# Patient Record
Sex: FEMALE | Race: WHITE | Employment: FULL TIME | URBAN - METROPOLITAN AREA
[De-identification: names, ages, dates, MRNs, and addresses within clinical notes are randomized per-mention and may not be internally consistent; named-entity substitution may affect disease eponyms.]

---

## 2018-07-31 PROBLEM — S46.111A RUPTURE OF RIGHT LONG HEAD BICEPS TENDON: Status: ACTIVE | Noted: 2018-07-31

## 2018-07-31 PROBLEM — M19.011 DEGENERATIVE JOINT DISEASE OF RIGHT ACROMIOCLAVICULAR JOINT: Status: ACTIVE | Noted: 2018-07-31

## 2018-07-31 PROBLEM — M25.811: Status: ACTIVE | Noted: 2018-07-31

## 2018-07-31 PROBLEM — S43.431A SUPERIOR GLENOID LABRUM LESION OF RIGHT SHOULDER: Status: ACTIVE | Noted: 2018-07-31

## 2018-07-31 NOTE — BRIEF OP NOTE
BRIEF OPERATIVE NOTE Date of Procedure: 8/10/2018 Preoperative Diagnosis:  BICEPITAL STRAIN RIGHT SHOULDER 
    SLAP TEAR RIGHT SHOULDER 
    AC OA RIGHT SHOULDER 
    OS ACROMIALE RIGHT SHOULDER Postoperative Diagnosis:  BICEPITAL STRAIN RIGHT SHOULDER 
    AC OA RIGHT SHOULDER 
    OA ACROMIALE RIGHT SHOULDER Procedure(s): ARTHROSCOPY RIGHT SHOULDER ARTHROSCOPIC DISTAL CLAVICLE RESECTION, EXTENSIVE DEBRIDEMENT GLENOHUMERAL JOINT, SUBACROMIAL SPACE, MINI OPEN BICEPS TENODESIS Surgeon(s) and Role: 
   * Gregg Hernandez MD - Primary Assistant Staff:  [facility] Surgical Staff: 
Circ-1: (Unknown) Scrub Tech-1: (Unknown) Scrub Tech-2: (Unknown) Scrub Tech-3: (Unknown) No case tracking events are documented in the log. Anesthesia:  GENERAL WITH INTERSCALENE BLOCK Estimated Blood Loss: 10 CC Complications: NONE Implants:  
 
Implant Name Type Inv. Item Serial No.  Lot No. LRB No. Used Action ANCHOR SUT 5.5MM W/NDL PEEK ZP --  - [id number]   ANCHOR SUT 5.5MM W/NDL PEEK ZP --  [id number] [facility] [id number] Right 1 Implanted Gregg Hernandez MD

## 2018-08-03 ENCOUNTER — HOSPITAL ENCOUNTER (OUTPATIENT)
Dept: SURGERY | Age: 33
Discharge: HOME | End: 2018-08-03

## 2018-08-08 VITALS — WEIGHT: 150 LBS | BODY MASS INDEX: 23.54 KG/M2 | HEIGHT: 67 IN

## 2018-08-08 RX ORDER — BISMUTH SUBSALICYLATE 262 MG
1 TABLET,CHEWABLE ORAL DAILY
COMMUNITY

## 2018-08-08 RX ORDER — DICLOFENAC SODIUM 10 MG/G
GEL TOPICAL 4 TIMES DAILY
COMMUNITY

## 2018-08-08 RX ORDER — TIZANIDINE 2 MG/1
TABLET ORAL
COMMUNITY

## 2018-08-08 RX ORDER — DICLOFENAC SODIUM 75 MG/1
50 TABLET, DELAYED RELEASE ORAL 2 TIMES DAILY
COMMUNITY

## 2018-08-08 NOTE — PERIOP NOTES
Patient verified name, , and surgery as listed in [facility]. Type 1B surgery, PAT phone assessment complete. Orders received. Labs per surgeon: Hemoglobin A1C DOS and POC FBS DOS; orders signed and held in [facility]. Labs per anesthesia protocol: POC UHCG DOS; order signed and held in [facility]. Patient answered medical/surgical history questions at their best of ability. All prior to admission medications documented in [facility]. Patient instructed to take the following medications the day of surgery according to anesthesia guidelines with a small sip of water: Tizanidine if needed. Hold all vitamins 7 days prior to surgery and NSAIDS 5 days prior to surgery. Medications to be held: all vitamins/supplements/herbals and Diclofenac. Patient instructed on the following:  Arrive at [address], time of arrival to be called the day before by 1700  NPO after midnight including gum, mints, and ice chips  Responsible adult must drive patient to the hospital, stay during surgery, and patient will  need supervision 24 hours after anesthesia  Use antibacterial soap in shower the night before surgery and on the morning of surgery  Leave all valuables (money and jewelry) at home but bring insurance card and ID on       DOS  Do not wear make-up, nail polish, lotions, cologne, perfumes, powders, or oil on skin. Patient teach back successful and patient demonstrates knowledge of instruction.

## 2018-08-10 ENCOUNTER — APPOINTMENT (OUTPATIENT)
Dept: GENERAL RADIOLOGY | Age: 33
End: 2018-08-10
Attending: ORTHOPAEDIC SURGERY
Payer: OTHER MISCELLANEOUS

## 2018-08-10 ENCOUNTER — ANESTHESIA EVENT (OUTPATIENT)
Dept: SURGERY | Age: 33
End: 2018-08-10
Payer: OTHER MISCELLANEOUS

## 2018-08-10 ENCOUNTER — ANESTHESIA (OUTPATIENT)
Dept: SURGERY | Age: 33
End: 2018-08-10
Payer: OTHER MISCELLANEOUS

## 2018-08-10 ENCOUNTER — HOSPITAL ENCOUNTER (OUTPATIENT)
Age: 33
Setting detail: OUTPATIENT SURGERY
Discharge: HOME | End: 2018-08-10
Attending: ORTHOPAEDIC SURGERY | Admitting: ORTHOPAEDIC SURGERY
Payer: OTHER MISCELLANEOUS

## 2018-08-10 VITALS
DIASTOLIC BLOOD PRESSURE: 68 MMHG | SYSTOLIC BLOOD PRESSURE: 121 MMHG | TEMPERATURE: 97.5 F | WEIGHT: 150.5 LBS | OXYGEN SATURATION: 98 % | RESPIRATION RATE: 16 BRPM | HEART RATE: 64 BPM | BODY MASS INDEX: 23.57 KG/M2

## 2018-08-10 PROBLEM — S43.431A SUPERIOR GLENOID LABRUM LESION OF RIGHT SHOULDER: Status: RESOLVED | Noted: 2018-07-31 | Resolved: 2018-08-10

## 2018-08-10 LAB
EST. AVERAGE GLUCOSE BLD GHB EST-MCNC: 105 MG/DL
GLUCOSE BLD STRIP.AUTO-MCNC: 81 MG/DL (ref 65–100)
HBA1C MFR BLD: 5.3 % (ref 4.8–6)
HCG UR QL: NEGATIVE

## 2018-08-10 PROCEDURE — 77030033073 HC TBNG ARTHSC PMP OUTFLO STRY -B: Performed by: ORTHOPAEDIC SURGERY

## 2018-08-10 PROCEDURE — 74011250636 HC RX REV CODE- 250/636

## 2018-08-10 PROCEDURE — 77030018836 HC SOL IRR NACL ICUM -A: Performed by: ORTHOPAEDIC SURGERY

## 2018-08-10 PROCEDURE — 76060000033 HC ANESTHESIA 1 TO 1.5 HR: Performed by: ORTHOPAEDIC SURGERY

## 2018-08-10 PROCEDURE — 77030031139 HC SUT VCRL2 J&J -A: Performed by: ORTHOPAEDIC SURGERY

## 2018-08-10 PROCEDURE — 77030004453 HC BUR SHV STRY -B: Performed by: ORTHOPAEDIC SURGERY

## 2018-08-10 PROCEDURE — 77030003666 HC NDL SPINAL BD -A: Performed by: ORTHOPAEDIC SURGERY

## 2018-08-10 PROCEDURE — 77030008703 HC TU ET UNCUF COVD -A: Performed by: NURSE ANESTHETIST, CERTIFIED REGISTERED

## 2018-08-10 PROCEDURE — 76942 ECHO GUIDE FOR BIOPSY: CPT | Performed by: ORTHOPAEDIC SURGERY

## 2018-08-10 PROCEDURE — 76210000016 HC OR PH I REC 1 TO 1.5 HR: Performed by: ORTHOPAEDIC SURGERY

## 2018-08-10 PROCEDURE — 74011250636 HC RX REV CODE- 250/636: Performed by: ORTHOPAEDIC SURGERY

## 2018-08-10 PROCEDURE — 77030006668 HC BLD SHV MENSCS STRY -B: Performed by: ORTHOPAEDIC SURGERY

## 2018-08-10 PROCEDURE — 77030020782 HC GWN BAIR PAWS FLX 3M -B: Performed by: NURSE ANESTHETIST, CERTIFIED REGISTERED

## 2018-08-10 PROCEDURE — 74011000250 HC RX REV CODE- 250

## 2018-08-10 PROCEDURE — 77030002986 HC SUT PROL J&J -A: Performed by: ORTHOPAEDIC SURGERY

## 2018-08-10 PROCEDURE — 77030018986 HC SUT ETHBND4 J&J -B: Performed by: ORTHOPAEDIC SURGERY

## 2018-08-10 PROCEDURE — A4565 SLINGS: HCPCS | Performed by: ORTHOPAEDIC SURGERY

## 2018-08-10 PROCEDURE — 77030003602 HC NDL NRV BLK BBMI -B: Performed by: NURSE ANESTHETIST, CERTIFIED REGISTERED

## 2018-08-10 PROCEDURE — 74011000250 HC RX REV CODE- 250: Performed by: ORTHOPAEDIC SURGERY

## 2018-08-10 PROCEDURE — 77030006891 HC BLD SHV RESECT STRY -B: Performed by: ORTHOPAEDIC SURGERY

## 2018-08-10 PROCEDURE — C1713 ANCHOR/SCREW BN/BN,TIS/BN: HCPCS | Performed by: ORTHOPAEDIC SURGERY

## 2018-08-10 PROCEDURE — 77030008477 HC STYL SATN SLP COVD -A: Performed by: NURSE ANESTHETIST, CERTIFIED REGISTERED

## 2018-08-10 PROCEDURE — 82962 GLUCOSE BLOOD TEST: CPT

## 2018-08-10 PROCEDURE — 83036 HEMOGLOBIN GLYCOSYLATED A1C: CPT

## 2018-08-10 PROCEDURE — 76010000160 HC OR TIME 0.5 TO 1 HR INTENSV-TIER 1: Performed by: ORTHOPAEDIC SURGERY

## 2018-08-10 PROCEDURE — 81025 URINE PREGNANCY TEST: CPT

## 2018-08-10 PROCEDURE — 76010010054 HC POST OP PAIN BLOCK: Performed by: ORTHOPAEDIC SURGERY

## 2018-08-10 PROCEDURE — 77030019908 HC STETH ESOPH SIMS -A: Performed by: NURSE ANESTHETIST, CERTIFIED REGISTERED

## 2018-08-10 PROCEDURE — 73030 X-RAY EXAM OF SHOULDER: CPT

## 2018-08-10 PROCEDURE — 77030033005 HC TBNG ARTHSC PMP STRY -B: Performed by: ORTHOPAEDIC SURGERY

## 2018-08-10 PROCEDURE — 74011250636 HC RX REV CODE- 250/636: Performed by: ANESTHESIOLOGY

## 2018-08-10 DEVICE — 5.5MM PEEK ZIP SUTURE ANCHOR WITH ¿ CIRCLE TAPER NEEDLES, #2 FORCE FIBER
Type: IMPLANTABLE DEVICE | Status: FUNCTIONAL
Brand: PEEK ZIP

## 2018-08-10 RX ORDER — KETOROLAC TROMETHAMINE 30 MG/ML
30 INJECTION, SOLUTION INTRAMUSCULAR; INTRAVENOUS AS NEEDED
Status: DISCONTINUED | OUTPATIENT
Start: 2018-08-10 | End: 2018-08-10 | Stop reason: HOSPADM

## 2018-08-10 RX ORDER — ROCURONIUM BROMIDE 10 MG/ML
INJECTION, SOLUTION INTRAVENOUS AS NEEDED
Status: DISCONTINUED | OUTPATIENT
Start: 2018-08-10 | End: 2018-08-10 | Stop reason: HOSPADM

## 2018-08-10 RX ORDER — LIDOCAINE HYDROCHLORIDE 20 MG/ML
INJECTION, SOLUTION EPIDURAL; INFILTRATION; INTRACAUDAL; PERINEURAL AS NEEDED
Status: DISCONTINUED | OUTPATIENT
Start: 2018-08-10 | End: 2018-08-10 | Stop reason: HOSPADM

## 2018-08-10 RX ORDER — GLYCOPYRROLATE 0.2 MG/ML
INJECTION INTRAMUSCULAR; INTRAVENOUS AS NEEDED
Status: DISCONTINUED | OUTPATIENT
Start: 2018-08-10 | End: 2018-08-10 | Stop reason: HOSPADM

## 2018-08-10 RX ORDER — LIDOCAINE HYDROCHLORIDE AND EPINEPHRINE 10; 10 MG/ML; UG/ML
INJECTION, SOLUTION INFILTRATION; PERINEURAL AS NEEDED
Status: DISCONTINUED | OUTPATIENT
Start: 2018-08-10 | End: 2018-08-10 | Stop reason: HOSPADM

## 2018-08-10 RX ORDER — FENTANYL CITRATE 50 UG/ML
INJECTION, SOLUTION INTRAMUSCULAR; INTRAVENOUS AS NEEDED
Status: DISCONTINUED | OUTPATIENT
Start: 2018-08-10 | End: 2018-08-10 | Stop reason: HOSPADM

## 2018-08-10 RX ORDER — ONDANSETRON 2 MG/ML
INJECTION INTRAMUSCULAR; INTRAVENOUS AS NEEDED
Status: DISCONTINUED | OUTPATIENT
Start: 2018-08-10 | End: 2018-08-10 | Stop reason: HOSPADM

## 2018-08-10 RX ORDER — BUPIVACAINE HYDROCHLORIDE AND EPINEPHRINE 5; 5 MG/ML; UG/ML
INJECTION, SOLUTION EPIDURAL; INTRACAUDAL; PERINEURAL AS NEEDED
Status: DISCONTINUED | OUTPATIENT
Start: 2018-08-10 | End: 2018-08-10 | Stop reason: HOSPADM

## 2018-08-10 RX ORDER — OXYCODONE HYDROCHLORIDE 5 MG/1
10 TABLET ORAL
Status: DISCONTINUED | OUTPATIENT
Start: 2018-08-10 | End: 2018-08-10 | Stop reason: HOSPADM

## 2018-08-10 RX ORDER — CEFAZOLIN SODIUM/WATER 2 G/20 ML
2 SYRINGE (ML) INTRAVENOUS
Status: COMPLETED | OUTPATIENT
Start: 2018-08-10 | End: 2018-08-10

## 2018-08-10 RX ORDER — BUPIVACAINE HYDROCHLORIDE AND EPINEPHRINE 2.5; 5 MG/ML; UG/ML
INJECTION, SOLUTION EPIDURAL; INFILTRATION; INTRACAUDAL; PERINEURAL AS NEEDED
Status: DISCONTINUED | OUTPATIENT
Start: 2018-08-10 | End: 2018-08-10 | Stop reason: HOSPADM

## 2018-08-10 RX ORDER — SODIUM CHLORIDE, SODIUM LACTATE, POTASSIUM CHLORIDE, CALCIUM CHLORIDE 600; 310; 30; 20 MG/100ML; MG/100ML; MG/100ML; MG/100ML
125 INJECTION, SOLUTION INTRAVENOUS CONTINUOUS
Status: DISCONTINUED | OUTPATIENT
Start: 2018-08-10 | End: 2018-08-10 | Stop reason: HOSPADM

## 2018-08-10 RX ORDER — MIDAZOLAM HYDROCHLORIDE 1 MG/ML
2 INJECTION, SOLUTION INTRAMUSCULAR; INTRAVENOUS ONCE
Status: COMPLETED | OUTPATIENT
Start: 2018-08-10 | End: 2018-08-10

## 2018-08-10 RX ORDER — NALOXONE HYDROCHLORIDE 0.4 MG/ML
0.1 INJECTION, SOLUTION INTRAMUSCULAR; INTRAVENOUS; SUBCUTANEOUS AS NEEDED
Status: DISCONTINUED | OUTPATIENT
Start: 2018-08-10 | End: 2018-08-10 | Stop reason: HOSPADM

## 2018-08-10 RX ORDER — SODIUM CHLORIDE 0.9 % (FLUSH) 0.9 %
5-10 SYRINGE (ML) INJECTION AS NEEDED
Status: DISCONTINUED | OUTPATIENT
Start: 2018-08-10 | End: 2018-08-10 | Stop reason: HOSPADM

## 2018-08-10 RX ORDER — NEOSTIGMINE METHYLSULFATE 1 MG/ML
INJECTION INTRAVENOUS AS NEEDED
Status: DISCONTINUED | OUTPATIENT
Start: 2018-08-10 | End: 2018-08-10 | Stop reason: HOSPADM

## 2018-08-10 RX ORDER — SODIUM CHLORIDE, SODIUM LACTATE, POTASSIUM CHLORIDE, CALCIUM CHLORIDE 600; 310; 30; 20 MG/100ML; MG/100ML; MG/100ML; MG/100ML
INJECTION, SOLUTION INTRAVENOUS
Status: DISCONTINUED | OUTPATIENT
Start: 2018-08-10 | End: 2018-08-10 | Stop reason: HOSPADM

## 2018-08-10 RX ORDER — PROPOFOL 10 MG/ML
INJECTION, EMULSION INTRAVENOUS AS NEEDED
Status: DISCONTINUED | OUTPATIENT
Start: 2018-08-10 | End: 2018-08-10 | Stop reason: HOSPADM

## 2018-08-10 RX ORDER — DEXAMETHASONE SODIUM PHOSPHATE 4 MG/ML
INJECTION, SOLUTION INTRA-ARTICULAR; INTRALESIONAL; INTRAMUSCULAR; INTRAVENOUS; SOFT TISSUE AS NEEDED
Status: DISCONTINUED | OUTPATIENT
Start: 2018-08-10 | End: 2018-08-10 | Stop reason: HOSPADM

## 2018-08-10 RX ORDER — HYDROMORPHONE HYDROCHLORIDE 2 MG/ML
0.5 INJECTION, SOLUTION INTRAMUSCULAR; INTRAVENOUS; SUBCUTANEOUS
Status: DISCONTINUED | OUTPATIENT
Start: 2018-08-10 | End: 2018-08-10 | Stop reason: HOSPADM

## 2018-08-10 RX ORDER — FENTANYL CITRATE 50 UG/ML
100 INJECTION, SOLUTION INTRAMUSCULAR; INTRAVENOUS ONCE
Status: COMPLETED | OUTPATIENT
Start: 2018-08-10 | End: 2018-08-10

## 2018-08-10 RX ADMIN — PROPOFOL 200 MG: 10 INJECTION, EMULSION INTRAVENOUS at 11:23

## 2018-08-10 RX ADMIN — BUPIVACAINE HYDROCHLORIDE AND EPINEPHRINE 12 ML: 2.5; 5 INJECTION, SOLUTION EPIDURAL; INFILTRATION; INTRACAUDAL; PERINEURAL at 10:33

## 2018-08-10 RX ADMIN — FENTANYL CITRATE 100 MCG: 50 INJECTION, SOLUTION INTRAMUSCULAR; INTRAVENOUS at 11:23

## 2018-08-10 RX ADMIN — LIDOCAINE HYDROCHLORIDE 100 MG: 20 INJECTION, SOLUTION EPIDURAL; INFILTRATION; INTRACAUDAL; PERINEURAL at 11:23

## 2018-08-10 RX ADMIN — MIDAZOLAM HYDROCHLORIDE 2 MG: 1 INJECTION, SOLUTION INTRAMUSCULAR; INTRAVENOUS at 10:27

## 2018-08-10 RX ADMIN — SODIUM CHLORIDE, SODIUM LACTATE, POTASSIUM CHLORIDE, CALCIUM CHLORIDE: 600; 310; 30; 20 INJECTION, SOLUTION INTRAVENOUS at 11:58

## 2018-08-10 RX ADMIN — ROCURONIUM BROMIDE 50 MG: 10 INJECTION, SOLUTION INTRAVENOUS at 11:23

## 2018-08-10 RX ADMIN — NEOSTIGMINE METHYLSULFATE 3 MG: 1 INJECTION INTRAVENOUS at 12:12

## 2018-08-10 RX ADMIN — Medication 2 G: at 11:16

## 2018-08-10 RX ADMIN — DEXAMETHASONE SODIUM PHOSPHATE 10 MG: 4 INJECTION, SOLUTION INTRA-ARTICULAR; INTRALESIONAL; INTRAMUSCULAR; INTRAVENOUS; SOFT TISSUE at 11:33

## 2018-08-10 RX ADMIN — ONDANSETRON 4 MG: 2 INJECTION INTRAMUSCULAR; INTRAVENOUS at 11:33

## 2018-08-10 RX ADMIN — GLYCOPYRROLATE 0.4 MG: 0.2 INJECTION INTRAMUSCULAR; INTRAVENOUS at 12:12

## 2018-08-10 RX ADMIN — FENTANYL CITRATE 100 MCG: 50 INJECTION INTRAMUSCULAR; INTRAVENOUS at 10:27

## 2018-08-10 RX ADMIN — SODIUM CHLORIDE, SODIUM LACTATE, POTASSIUM CHLORIDE, CALCIUM CHLORIDE: 600; 310; 30; 20 INJECTION, SOLUTION INTRAVENOUS at 11:15

## 2018-08-10 RX ADMIN — BUPIVACAINE HYDROCHLORIDE AND EPINEPHRINE 12 ML: 5; 5 INJECTION, SOLUTION EPIDURAL; INTRACAUDAL; PERINEURAL at 10:33

## 2018-08-10 NOTE — ANESTHESIA PROCEDURE NOTES
Peripheral Block    Start time: 8/10/2018 10:28 AM  End time: 8/10/2018 10:33 AM  Performed by: Janey Goodpasture  Authorized by: Janey Goodpasture       Pre-procedure: Indications: at surgeon's request and post-op pain management    Preanesthetic Checklist: patient identified, risks and benefits discussed, site marked, timeout performed, anesthesia consent given and patient being monitored    Timeout Time: 10:27          Block Type:   Block Type:   Interscalene  Laterality:  Left  Monitoring:  Standard ASA monitoring, responsive to questions, oxygen, continuous pulse ox, frequent vital sign checks and heart rate  Injection Technique:  Single shot  Procedures: ultrasound guided and nerve stimulator    Patient Position: supine  Prep: chlorhexidine    Location:  Interscalene  Needle Type:  Stimuplex  Needle Gauge:  22 G  Needle Localization:  Nerve stimulator and ultrasound guidance  Motor Response: minimal motor response >0.4 mA    Medication Injected:  0.375%  bupivacaine  Adds:  Epi 1:200K  Volume (mL):  24    Assessment:  Number of attempts:  1  Injection Assessment:  Incremental injection every 5 mL, negative aspiration for CSF, ultrasound image on chart, local visualized surrounding nerve on ultrasound, negative aspiration for blood, no intravascular symptoms and no paresthesia  Patient tolerance:  Patient tolerated the procedure well with no immediate complications

## 2018-08-10 NOTE — ANESTHESIA POSTPROCEDURE EVALUATION
Post-Anesthesia Evaluation and Assessment    Patient: Nabeel Milan MRN: [id number]  SSN: [id number]    YOB: 1985  Age: 29 y.o. Sex: female       Cardiovascular Function/Vital Signs  Visit Vitals    /68 (BP 1 Location: Right arm, BP Patient Position: At rest)    Pulse 64    Temp 36.4 °C (97.5 °F)    Resp 16    Wt 68.3 kg (150 lb 8 oz)    SpO2 98%    BMI 23.57 kg/m2       Patient is status post general anesthesia for Procedure(s):  ARTHROSCOPY RIGHT SHOULDER ARTHROSCOPIC DISTAL CLAVICLE RESECTION, EXTENSIVE DEBRIDEMENT GLENOHUMERAL JOINT, SUBACROMIAL SPACE, MINI OPEN BICEPS TENODESIS. Nausea/Vomiting: None    Postoperative hydration reviewed and adequate. Pain:  Pain Scale 1: Numeric (0 - 10) (08/10/18 1320)  Pain Intensity 1: 0 (08/10/18 1320)   Managed    Neurological Status:   Neuro (WDL): Within Defined Limits (08/10/18 1320)  Neuro  Neurologic State: Alert (08/10/18 1320)  Orientation Level: Oriented X4 (08/10/18 1320)   At baseline    Mental Status and Level of Consciousness: Arousable    Pulmonary Status:   O2 Device: Room air (08/10/18 1320)   Adequate oxygenation and airway patent    Complications related to anesthesia: None    Post-anesthesia assessment completed.  No concerns    Signed By: Eloisa Hendricks MD     August 10, 2018

## 2018-08-10 NOTE — ANESTHESIA PREPROCEDURE EVALUATION
Anesthetic History               Review of Systems / Medical History  Patient summary reviewed, nursing notes reviewed and pertinent labs reviewed    Pulmonary          Smoker (1 ppd)         Neuro/Psych              Cardiovascular                  Exercise tolerance: >4 METS     GI/Hepatic/Renal                Endo/Other        Arthritis     Other Findings              Physical Exam    Airway  Mallampati: I  TM Distance: 4 - 6 cm  Neck ROM: normal range of motion   Mouth opening: Normal     Cardiovascular  Regular rate and rhythm,  S1 and S2 normal,  no murmur, click, rub, or gallop             Dental    Dentition: Poor dentition  Comments: Multiple caries   Pulmonary  Breath sounds clear to auscultation               Abdominal  GI exam deferred       Other Findings            Anesthetic Plan    ASA: 1  Anesthesia type: general      Post-op pain plan if not by surgeon: peripheral nerve block single      Anesthetic plan and risks discussed with: Patient

## 2018-08-10 NOTE — DISCHARGE INSTRUCTIONS
INSTRUCTIONS FOLLOWING ARTHROSCOPY SURGERY  Dr. Renetta Cota [phone]    ACTIVITY   As tolerated and as directed by your doctor   Elevate surgery site first 48 hours.  Use arm sling or crutches per your doctors instructions.  Bathe or shower as directed by your doctor. DIET   Clear liquids until no nausea or vomiting; then light diet for the first day   Advance to regular diet on second day, unless your doctor orders otherwise.  If nausea and vomiting continues, call your doctor. PAIN   Take pain medication as directed by your doctor.  Call your doctor if pain is NOT relieved by medication.  DO NOT take aspirin or blood thinners until directed by your doctor. DRESSING CARE: Follow all dressing care instructions provided by Dr. Trena Elizondo will be made by nursing staff.  If you have any problems or concerns, call your doctor as needed. CALL YOUR DOCTOR IF   Excessive bleeding that does not stop after holding mild pressure over the area   Temperature of 101°F or above   Redness, excessive swelling or bruising, and/or green or yellow, smelly discharge from incision    AFTER ANESTHESIA   For the next 24 hours: DO NOT Drive, Drink alcoholic beverages, or Make important decisions.  Be aware of dizziness following anesthesia and while taking pain medication. MEDICATIONS:  Continue home medications as previously prescribed with the following changes: none.     Cryo Cuff or Iceman 24-48 hours continuously

## 2018-08-10 NOTE — ADDENDUM NOTE
Addendum  created 08/10/18 1545 by Mojgan Lewis MD    Anesthesia Intra Blocks edited, Anesthesia Intra Meds edited, Child order released for a procedure order, Sign clinical note

## 2018-08-10 NOTE — H&P
Date of Surgery Update:  Benja Beauchamp was seen and examined. History and physical has been reviewed. The patient has been examined.  There have been no significant clinical changes since the completion of the originally dated History and Physical.    Signed By: Saad Raman MD     August 10, 2018 8:21 AM

## 2018-08-11 NOTE — OP NOTES
[facility] REPORT    Terence Brito  MR#: [id number]  : 1985  ACCOUNT #: [de-identified]   DATE OF SERVICE: 08/10/2018    PREOPERATIVE DIAGNOSES:  1. Bicipital strain, right shoulder. 2. SLAP tear, right shoulder. 3.  Os acromiale, right shoulder. 4.  Acromioclavicular joint arthritis, right shoulder. POSTOPERATIVE DIAGNOSES:  1. Bicipital strain, right shoulder. 2.  Os acromiale, right shoulder. 3.  Acromioclavicular joint arthritis, right shoulder. PROCEDURE PERFORMED:  Arthroscopy of right shoulder, arthroscopic distal clavicle resection, extensive debridement of glenohumeral joint, subacromial space, mini open biceps tenodesis. SURGEON:  William Rolon. Rudy Tee MD      PATHOLOGY:  1. Type 1 acromion with an os acromiale. 2.  AC joint arthritis. 3.  Bicipital strain. CPT CODES:  [id number], [id number], [id number]. ICD-10 CODES:  D61.951, M19.011, M25.811. ANESTHESIA:  General with an interscalene block. ESTIMATED BLOOD LOSS:  10 mL. COMPLICATIONS:  None. HARDWARE UTILIZED:  One Nelly 5.5 anchor. INDICATIONS:  The patient is a 33-year-old female who injured her right shoulder working at [employer]. Preoperative physical exam, radiographs and an MRI suggested bicipital strain, SLAP tear, AC joint arthritis. She has meso os acromiale. Patient has exhausted extensive nonoperative modalities and electively admitted for operative intervention. PROCEDURE:  Following identification of the patient, the patient was taken to the operative suite. Following induction of general anesthesia, interscalene block for postop pain control, 2  grams of IV Ancef, and measurement of a hemoglobin A1c and fasting blood glucose 5.3 and 81, normal respectively, the patient was then positioned on the operating table in the supine fashion. Right shoulder was examined under anesthesia and noted to be stable through full range of motion.   At this point, the patient was carefully positioned in the lateral decubitus position, left side down. Axillary roll was placed, beanbag was inflated. Care was taken to pad both dependent lower and upper extremities. Right arm was placed in the Dyonics traction device, 15 pounds of traction. Right shoulder was then prepped and draped in the sterile fashion. The subacromial space was then injected with 10 mL of 1% Xylocaine with epinephrine. Scope was introduced into the shoulder. Diagnostic arthroscopy then commenced. Articular surface of the humeral head and glenoid were visualized and noted to be intact. Anterior, posterior, superior, inferior labrum were visualized and intact. Undersurface of the rotator cuff was visualized in its entirety and intact. Biceps was visualized. There was marked erythema of the biceps tendon, particularly as it exited the bicipital groove in its extraarticular portion. The scope was then flip-flopped from the posterior to anterior portal.  Posterior cuff and labrum were intact. The scope was then placed in the subacromial space. Lateral portal was then established. Hypertrophic hemorrhagic bursal tissue was then resected. There was significant hypertrophic hemorrhagic bursal tissue. Bursal side of the cuff was intact. CA ligament was pristine. At this point, using an Oratec wand, an acromionizer bur and a 5.5 full resector, an arthroscopic distal clavicle resection was then performed; 10 mm of distal clavicle was then resected. Care was taken to preserve the posterior superior capsule. At this point, given the bicipital pathology it was elected to perform a mini open approach. The lateral portal was extended 3 cm. Deltoid split was carried up to the acromion. The biceps tendon was identified and was markedly erythematous. It was dissected free, brought out to length, tagged, transected, and tenodesed utilizing one 5.5 anchor.   This was oversewn using #2 Mersilene sutures. At this point, the scope was then placed back into the humeral joint. The stump of the biceps was debrided back to stable structure. Undersurface of the rotator cuff was visualized and still intact. Scope was then placed back to the bursal side. The bursal side of the cuff again was intact. At this point, with the procedure complete, arthroscopic equipment was removed from within the shoulder. The portals were fashioned using #2 nylon horizontal mattress sutures. The lateral wound was closed with 0 Vicryl figure-of-eight sutures and a 2-0 Prolene subcuticular stitch. A sterile dressing was applied. Cryo/Cuff and swathe was applied. The patient was then transferred to the recovery room in stable condition. This injury was secondary to workplace injury.       MD AIMEE Cazares / WILDA  D: 08/10/2018 12:31     T: 08/10/2018 22:14  JOB #: [id number]  CC: Radha Rocha MD

## 2018-11-02 NOTE — IP AVS SNAPSHOT
[address] [address] [phone] Patient: Checo Patel MRN: [id number] :1985 About your hospitalization You were admitted on:  August 10, 2018 You last received care in the:  [facility] PACU You were discharged on:  August 10, 2018 Why you were hospitalized Your primary diagnosis was:  Rupture Of Right Long Head Biceps Tendon Your diagnoses also included:  Superior Glenoid Labrum Lesion Of Right Shoulder, Degenerative Joint Disease Of Right Acromioclavicular Joint, Os Acromiale, Right Follow-up Information Follow up With Details Comments Contact Info None   None ([phone]) Patient stated that they have no PCP Rosa Oppenheim., MD  Keep scheduled appointment [address] [facility] 
[address] [facility] [address] [place] [address] Discharge Orders None A check sarita indicates which time of day the medication should be taken. My Medications ASK your doctor about these medications Instructions Each Dose to Equal  
 Morning Noon Evening Bedtime * diclofenac EC 75 mg EC tablet Commonly known as:  VOLTAREN Your last dose was: Your next dose is: Take 75 mg by mouth two (2) times a day. 75 mg  
    
   
   
   
  
 * diclofenac 1 % Gel Commonly known as:  VOLTAREN Your last dose was: Your next dose is:    
   
   
 Apply  to affected area four (4) times daily. multivitamin tablet Commonly known as:  ONE A DAY Your last dose was: Your next dose is: Take 1 Tab by mouth daily. 1 Tab  
    
   
   
   
  
 tiZANidine 2 mg tablet Commonly known as:  Jocelynsabine Up Your last dose was: Your next dose is: Take  by mouth three (3) times daily as needed. * Notice: This list has 2 medication(s) that are the same as other medications prescribed for you. Read the directions carefully, and ask your doctor or other care provider to review them with you. Discharge Instructions INSTRUCTIONS FOLLOWING ARTHROSCOPY SURGERY Dr. Lashonda Lovett [phone] ACTIVITY  As tolerated and as directed by your doctor  Elevate surgery site first 48 hours.  Use arm sling or crutches per your doctors instructions.  Bathe or shower as directed by your doctor. DIET  Clear liquids until no nausea or vomiting; then light diet for the first day  Advance to regular diet on second day, unless your doctor orders otherwise.  If nausea and vomiting continues, call your doctor. PAIN 
 Take pain medication as directed by your doctor.  Call your doctor if pain is NOT relieved by medication.  DO NOT take aspirin or blood thinners until directed by your doctor. DRESSING CARE: Follow all dressing care instructions provided by Dr. Lashonda Lovett FOLLOW-UP PHONE CALLS  Calls will be made by nursing staff.  If you have any problems or concerns, call your doctor as needed. CALL YOUR DOCTOR IF 
 Excessive bleeding that does not stop after holding mild pressure over the area  Temperature of 101°F or above  Redness, excessive swelling or bruising, and/or green or yellow, smelly discharge from incision AFTER ANESTHESIA  For the next 24 hours: DO NOT Drive, Drink alcoholic beverages, or Make important decisions.  Be aware of dizziness following anesthesia and while taking pain medication. MEDICATIONS: 
Continue home medications as previously prescribed with the following changes: none. Cryo Cuff or Iceman 24-48 hours continuously Introducing [facility]! [facility] introduces Within3 patient portal. Now you can access parts of your medical record, email your doctor's office, and request medication refills online. 1. In your internet browser, go to [email or web address]. [employer]/Make My platehart 2. Click on the First Time User? Click Here link in the Sign In box. You will see the New Member Sign Up page. 3. Enter your [facility] Access Code exactly as it appears below. You will not need to use this code after youve completed the sign-up process. If you do not sign up before the expiration date, you must request a new code. · [facility] Access Code: [email or web address] Expires: 10/10/2018 11:52 PM 
 
4. Enter the last four digits of your Social Security Number (xxxx) and Date of Birth (mm/dd/yyyy) as indicated and click Submit. You will be taken to the next sign-up page. 5. Create a [employer] ID. This will be your [facility] login ID and cannot be changed, so think of one that is secure and easy to remember. 6. Create a [facility] password. You can change your password at any time. 7. Enter your Password Reset Question and Answer. This can be used at a later time if you forget your password. 8. Enter your e-mail address. You will receive e-mail notification when new information is available in [address] 9. Click Sign Up. You can now view and download portions of your medical record. 10. Click the Download Summary menu link to download a portable copy of your medical information. If you have questions, please visit the Frequently Asked Questions section of the [facility] website. Remember, [facility] is NOT to be used for urgent needs. For medical emergencies, dial 911. Now available from your iPhone and Android! Introducing Alex Dillard As a [facility] patient, I wanted to make you aware of our electronic visit tool called Alex Dillard. [facility] 24/7 allows you to connect within minutes with a medical provider 24 hours a day, seven days a week via a mobile device or tablet or logging into a secure website from your computer. You can access Alex Dillard from anywhere in the [place]. A virtual visit might be right for you when you have a simple condition and feel like you just dont want to get out of bed, or cant get away from work for an appointment, when your regular [facility] provider is not available (evenings, weekends or holidays), or when youre out of town and need minor care. Electronic visits cost only $49 and if the [employer] 24/7 provider determines a prescription is needed to treat your condition, one can be electronically transmitted to a nearby pharmacy*. Please take a moment to enroll today if you have not already done so. The enrollment process is free and takes just a few minutes. To enroll, please download the [employer] 24/7 sierra to your tablet or phone, or visit [email or web address] to enroll on your computer. And, as an [address] [place] [address] patient with a [employer] account, the results of your visits will be scanned into your electronic medical record and your primary care provider will be able to view the scanned results. We urge you to continue to see your regular [facility] provider for your ongoing medical care. And while your primary care provider may not be the one available when you seek a [facility] [employer] virtual visit, the peace of mind you get from getting a real diagnosis real time can be priceless. For more information on Alex Gremlntongfin, view our Frequently Asked Questions (FAQs) at [email or web address]. Sincerely, 
 
Shanon Alberto MD 
Chief Medical Officer Mirtha Shah *:  certain medications cannot be prescribed via [employer] Providers Seen During Your Hospitalization Provider Specialty Primary office phone Jonathan Delatorre MD Orthopedic Surgery [phone] Your Primary Care Physician (PCP) Primary Care Physician Office Phone Office Fax NONE ** None ** ** None ** You are allergic to the following No active allergies Recent Documentation Weight BMI Smoking Status 68.3 kg 23.57 kg/m2 Current Every Day Smoker Emergency Contacts Name Discharge Info Relation Home Work Mobile ArlenemaxineChandler DISCHARGE CAREGIVER [3] Friend [5]   [phone] Patient Belongings The following personal items are in your possession at time of discharge: 
  Dental Appliances: None Please provide this summary of care documentation to your next provider. Signatures-by signing, you are acknowledging that this After Visit Summary has been reviewed with you and you have received a copy. Patient Signature:  ____________________________________________________________ Date:  ____________________________________________________________  
  
[facility] Provider Signature:  ____________________________________________________________ Date:  ____________________________________________________________ Declines

## 2022-03-19 PROBLEM — M25.811: Status: ACTIVE | Noted: 2018-07-31

## 2022-03-19 PROBLEM — M19.011 DEGENERATIVE JOINT DISEASE OF RIGHT ACROMIOCLAVICULAR JOINT: Status: ACTIVE | Noted: 2018-07-31

## 2022-03-20 PROBLEM — S46.111A RUPTURE OF RIGHT LONG HEAD BICEPS TENDON: Status: ACTIVE | Noted: 2018-07-31

## 2022-05-06 ENCOUNTER — APPOINTMENT (OUTPATIENT)
Dept: GENERAL RADIOLOGY | Age: 37
End: 2022-05-06
Attending: EMERGENCY MEDICINE
Payer: OTHER MISCELLANEOUS

## 2022-05-06 ENCOUNTER — HOSPITAL ENCOUNTER (EMERGENCY)
Age: 37
Discharge: HOME | End: 2022-05-06
Attending: EMERGENCY MEDICINE
Payer: OTHER MISCELLANEOUS

## 2022-05-06 VITALS
TEMPERATURE: 98.8 F | OXYGEN SATURATION: 97 % | WEIGHT: 130 LBS | BODY MASS INDEX: 20.89 KG/M2 | DIASTOLIC BLOOD PRESSURE: 79 MMHG | HEART RATE: 80 BPM | SYSTOLIC BLOOD PRESSURE: 106 MMHG | HEIGHT: 66 IN | RESPIRATION RATE: 18 BRPM

## 2022-05-06 DIAGNOSIS — M25.512 ACUTE PAIN OF LEFT SHOULDER: Primary | ICD-10-CM

## 2022-05-06 DIAGNOSIS — S16.1XXA ACUTE CERVICAL MYOFASCIAL STRAIN, INITIAL ENCOUNTER: ICD-10-CM

## 2022-05-06 PROCEDURE — 73030 X-RAY EXAM OF SHOULDER: CPT

## 2022-05-06 PROCEDURE — 72040 X-RAY EXAM NECK SPINE 2-3 VW: CPT

## 2022-05-06 PROCEDURE — 99283 EMERGENCY DEPT VISIT LOW MDM: CPT

## 2022-05-06 RX ORDER — PREDNISONE 20 MG/1
40 TABLET ORAL DAILY
Qty: 10 TABLET | Refills: 0 | Status: SHIPPED | OUTPATIENT
Start: 2022-05-06 | End: 2022-05-11

## 2022-05-06 RX ORDER — PREDNISONE 20 MG/1
20 TABLET ORAL
COMMUNITY
End: 2022-05-06

## 2022-05-06 NOTE — ED PROVIDER NOTES
72-year-old female complains of left shoulder pain. Has a history of right shoulder surgery for which she seen determine overuse syndrome. She states the pain started up at the end of March. Was seen in urgent care. X-rays are negative. Placed in sling. Has been maintained on muscle relaxers and anti-inflammatories. States she has been evaluated as a Workmen's Comp. situation. She went back over to urgent care today because she has had some tingling in her left thumb and index finger for the last week. She does have some pain in her neck and it hurts when she rotates her neck as well. No weakness. No rash. Denies other medical problems. Into urgent care and was sent here. No headache or fever. Patient states there is no specific injury to her left shoulder but she was performing the duties of 5 people\" at work and the repetitive motion of some of the activities started her shoulder hurting. The history is provided by the patient. Shoulder Injury   The incident occurred more than 1 week ago. The incident occurred at work. The injury mechanism is unknown. The left shoulder is affected. The pain is moderate. The pain has been constant since onset. The pain does not radiate. She has no other injuries. There is a history of shoulder surgery. Associated symptoms include numbness. Shoulder Pain   Associated symptoms include numbness.         Past Medical History:   Diagnosis Date    Current smoker     Primary osteoarthritis, right shoulder     Superior glenoid labrum lesion of right shoulder        Past Surgical History:   Procedure Laterality Date    HX CYST REMOVAL      HX TONSILLECTOMY           Family History:   Problem Relation Age of Onset    Heart Disease Father     Diabetes Father        Social History     Socioeconomic History    Marital status: SINGLE     Spouse name: Not on file    Number of children: Not on file    Years of education: Not on file    Highest education level: Not on file   Occupational History    Not on file   Tobacco Use    Smoking status: Current Every Day Smoker     Packs/day: 1.00     Years: 4.00     Pack years: 4.00    Smokeless tobacco: Never Used   Substance and Sexual Activity    Alcohol use: Yes     Comment: rare    Drug use: No    Sexual activity: Not on file   Other Topics Concern    Not on file   Social History Narrative    Not on file     Social Determinants of Health     Financial Resource Strain:     Difficulty of Paying Living Expenses: Not on file   Food Insecurity:     Worried About Running Out of Food in the Last Year: Not on file    Denia of Food in the Last Year: Not on file   Transportation Needs:     Lack of Transportation (Medical): Not on file    Lack of Transportation (Non-Medical): Not on file   Physical Activity:     Days of Exercise per Week: Not on file    Minutes of Exercise per Session: Not on file   Stress:     Feeling of Stress : Not on file   Social Connections:     Frequency of Communication with Friends and Family: Not on file    Frequency of Social Gatherings with Friends and Family: Not on file    Attends Restoration Services: Not on file    Active Member of [address] [place] [address] or Organizations: Not on file    Attends Club or Organization Meetings: Not on file    Marital Status: Not on file   Intimate Partner Violence:     Fear of Current or Ex-Partner: Not on file    Emotionally Abused: Not on file    Physically Abused: Not on file    Sexually Abused: Not on file   Housing Stability:     Unable to Pay for Housing in the Last Year: Not on file    Number of [place] in the Last Year: Not on file    Unstable Housing in the Last Year: Not on file         ALLERGIES: Patient has no known allergies. Review of Systems   Constitutional: Negative for chills and fever. Respiratory: Negative for shortness of breath. Cardiovascular: Negative for chest pain. Musculoskeletal: Positive for neck pain.    Skin: Negative for color change and rash. Neurological: Positive for numbness. Negative for weakness. Vitals:    05/06/22 1121 05/06/22 1122   BP:  124/74   Pulse: 80    Resp: 18    Temp: 98.8 °F (37.1 °C)    SpO2: 99%    Weight: 59 kg (130 lb)    Height: 5' 6\" (1.676 m)             Physical Exam  Vitals and nursing note reviewed. Constitutional:       Appearance: She is not ill-appearing. HENT:      Mouth/Throat:      Mouth: Mucous membranes are moist.      Pharynx: Oropharynx is clear. Eyes:      General: No scleral icterus. Conjunctiva/sclera: Conjunctivae normal.   Neck:      Comments: Tenderness, very mild to the left paracervical musculature  Musculoskeletal:      Left shoulder: Tenderness and bony tenderness present. No crepitus. Decreased range of motion. Normal pulse. Left hand: No swelling or tenderness. Cervical back: Normal range of motion. Comments: Plus 2+ radial pulse. No issues with abduction or abduction of fingers. Can oppose thumb. Good dorsiflexion. Decreased sensation in index finger. Skin:     General: Skin is warm and dry. Neurological:      Mental Status: She is alert. MDM  Number of Diagnoses or Management Options  Diagnosis management comments: Ongoing neck and left shoulder pain. Check C-spine to assess for any spinal listhesis or cervical spine abnormality. Shoulder imaging. No evidence for infection. Amount and/or Complexity of Data Reviewed  Tests in the radiology section of CPT®: ordered and reviewed  Discuss the patient with other providers: yes    Risk of Complications, Morbidity, and/or Mortality  Presenting problems: moderate  Diagnostic procedures: minimal  Management options: low    Patient Progress  Patient progress: stable         Procedures      Results Include:    No results found for this or any previous visit (from the past 24 hour(s)).     XR SPINE CERV PA LAT ODONT 3 V MAX    Result Date: 5/6/2022  History: Neck and left shoulder pain EXAM: Cervical spine series and left shoulder series FINDINGS: Cervical spine: 3 views cervical spine demonstrate normal alignment. No fracture or prevertebral soft tissue edema. Lung apices are clear. Left shoulder: No fracture or dislocation. No additional bony abnormality. Unremarkable exams. XR SHOULDER LT AP/LAT MIN 2 V    Result Date: 5/6/2022  History: Neck and left shoulder pain EXAM: Cervical spine series and left shoulder series FINDINGS: Cervical spine: 3 views cervical spine demonstrate normal alignment. No fracture or prevertebral soft tissue edema. Lung apices are clear. Left shoulder: No fracture or dislocation. No additional bony abnormality. Unremarkable exams. Will refer to orthopedics.

## 2022-05-06 NOTE — ED NOTES
I have reviewed discharge instructions with the patient. The patient verbalized understanding. Patient left ED via Discharge Method: ambulatory to Home with self. Opportunity for questions and clarification provided. Patient given 1 scripts. To continue your aftercare when you leave the hospital, you may receive an automated call from our care team to check in on how you are doing. This is a free service and part of our promise to provide the best care and service to meet your aftercare needs.  If you have questions, or wish to unsubscribe from this service please call [phone]. Thank you for Choosing our [facility] Emergency Department.

## 2022-05-06 NOTE — DISCHARGE INSTRUCTIONS
Rest.  Heating pad or hot water bottle. Continue current medications. Trial of prednisone for 5 days to try to help cut down on tingling. Call orthopedist for follow-up. Do not wear splint continuously as it may cause the shoulder to freeze up. Our  at [phone] may be helpful in getting you a follow-up appointment. Continue not to use that arm at work.

## 2022-05-06 NOTE — ED TRIAGE NOTES
Pt states she had shoulder injury at work march 24th, was put in sling by urgent care XR was negative. Having continued pain in left shoulder, now states having numbness in fingers on left side.

## 2022-06-20 ENCOUNTER — OFFICE VISIT (OUTPATIENT)
Dept: ORTHOPEDIC SURGERY | Age: 37
End: 2022-06-20
Payer: COMMERCIAL

## 2022-06-20 VITALS — HEIGHT: 66 IN | WEIGHT: 143.4 LBS | BODY MASS INDEX: 23.05 KG/M2

## 2022-06-20 DIAGNOSIS — M54.2 CERVICALGIA: ICD-10-CM

## 2022-06-20 DIAGNOSIS — M19.012 DEGENERATIVE JOINT DISEASE OF LEFT ACROMIOCLAVICULAR JOINT: ICD-10-CM

## 2022-06-20 DIAGNOSIS — S43.402A SPRAIN OF LEFT SHOULDER, UNSPECIFIED SHOULDER SPRAIN TYPE, INITIAL ENCOUNTER: Primary | ICD-10-CM

## 2022-06-20 DIAGNOSIS — M25.812 OS ACROMIALE OF LEFT SHOULDER: ICD-10-CM

## 2022-06-20 PROCEDURE — 99214 OFFICE O/P EST MOD 30 MIN: CPT | Performed by: ORTHOPAEDIC SURGERY

## 2022-06-20 PROCEDURE — 20610 DRAIN/INJ JOINT/BURSA W/O US: CPT | Performed by: ORTHOPAEDIC SURGERY

## 2022-06-20 RX ORDER — DICLOFENAC SODIUM 75 MG/1
75 TABLET, DELAYED RELEASE ORAL 2 TIMES DAILY
Qty: 60 TABLET | Refills: 0 | Status: SHIPPED | OUTPATIENT
Start: 2022-06-20

## 2022-06-20 NOTE — PROGRESS NOTES
Name: Archie Collet  YOB: 1985  Gender: female  MRN: [id number]      What: Left shoulder pain  How: Related to work activities at [employer]  When: 3/24/2022      HPI: Archie Collet is a 31 y.o. right-hand-dominant female seen for left shoulder problems. She is almost 4 years status post arthroscopy of the right shoulder ADCR extensive debridement glenohumeral joint subacromial space and mini open biceps tenodesis from a work-related right shoulder injury from which she had an excellent result. She continues to work at [employer]. She denies any previous left shoulder problems. She denies any significant medical issues. She was on the job working at [employer] on 3/24/2022. Apparently several of her coworkers called out. She did not have a specific injury. She was forced to do more job activities. Later that night she noted the onset of severe left shoulder pain and pain radiating down the left arm. She ultimately went to [facility] urgent care and then to the emergency room. She has been given oral steroids muscle relaxers and anti-inflammatories without improvement. she had x-rays of her shoulder and neck. She complains of severe left shoulder pain. She was put out of work. Apparently her claim has been denied by work comp. She has an . She has been out of work. The shoulder hurts. Because her left shoulder is hurting her right shoulder shoulder has been doing more activities and that has been popping. She denies any previous left shoulder issues. No significant numbness or tingling. He continues to smoke      ROS/Meds/PSH/PMH/FH/SH: A ten system review of systems was performed and is negative other than what is in the HPI. Tobacco:  reports that she has been smoking. She has been smoking about 1.00 pack per day.  She has never used smokeless tobacco.  Ht 5' 6\" (1.676 m)   Wt 143 lb 6.4 oz (65 kg)   BMI 23.15 kg/m²      Physical Examination:  She is an awake alert pleasant female ambulating without difficulty  She has a slight restriction in cervical spine range of motion without radicular findings  She has bilateral trapezial tenderness    Her right shoulder has well-healed incisions  Some crepitance  The right shoulder has 0 to 180 degrees of active and 0 to 180 degrees passive forward elevation. Internal rotation is to T6. External rotation is to 60 degrees at the side. In the 90 degree abducted position 90 degrees of external and 90 degrees internal rotation  The AC joint is non-tender  SC joint is non-tender. Greater tuberosity is non-tender. negative biceps  Negative O'Briens sign  negative lift-off sign  Negative belly press sign  Negative bear huggers sign  negative drop sign  negative hornblower's sign  No posterior glenohumeral joint line tenderness. No evident excessive external rotation  Rotator cuff strength is 5/5.  negative external rotation stress test.   Negative empty can sign  There is no evident anterior or posterior apprehension with a negative sulcus sign. No instability  negative external and internal Rotation lag sign  Neurovascularly intact. The left shoulder has 0 to 120 degrees of active and 0 to 150 degrees passive forward elevation. Pain in the overhead position  Internal rotation is to T10. External rotation is to 60 degrees at the side. In the 90 degree abducted position 90 degrees of external and 90 degrees internal rotation  The AC joint is tender  SC joint is non-tender. Greater tuberosity is tender. negative biceps  Negative O'Briens sign  negative lift-off sign  Negative belly press sign  Negative bear huggers sign  negative drop sign  negative hornblower's sign  No posterior glenohumeral joint line tenderness. No evident excessive external rotation  Rotator cuff strength is 5/5.   Positive external rotation stress test.   Positive empty can sign  There is no evident anterior or posterior apprehension with a negative sulcus sign. No instability  negative external and internal Rotation lag sign  Neurovascularly intact. Data Reviewed:        XR: AP AP in the scapular outlet throwers and axillary views left shoulder   Clinical Indication    ICD-10-CM    1. Sprain of left shoulder, unspecified shoulder sprain type, initial encounter  S43.402A XR SHOULDER LEFT (MIN 2 VIEWS)     Amb External Referral To Physical Therapy     MRI SHOULDER LEFT WO CONTRAST   2. Degenerative joint disease of left acromioclavicular joint  M19.012    3. Os acromiale of left shoulder  M25.812    4. Cervicalgia  M54.2       Report: AP AP scapular outlet throwers and axillary views left shoulder demonstrate a type II acromion. Degenerative changes in the [facility] joint. Evidence of an os acromiale. No fracture. No dislocation. Impression: AC OA left shoulder  Os acromiale left shoulder   Wesley Bergeron MD              Minor Procedure:    OFFICE PROCEDURE PROGRESS NOTE    Chart reviewed for the following:   Davonte Watts MD, have reviewed the History, Physical and updated the Allergic reactions for Wendy SHARIF Jovon     TIME OUT performed immediately prior to start of procedure:   Davonte Watts MD, have performed the following reviews on [address] [place] [address] prior to the start of the procedure:            * Patient was identified by name and date of birth   * Agreement on procedure being performed was verified  * Risks and Benefits explained to the patient  * Procedure site verified and marked as necessary  * Patient was positioned for comfort     Time: 11:21 AM  Date of procedure: 6/20/2022  Procedure performed by:  Wesley Bergeron MD    After sterile prep and drape of the left shoulder, the patient received a 9:1 injection into the subacromial space. It consisted of 4.5 mL of 2% Xylocaine, 4.5 mL of 0.5% bupivacaine and 80 mg of Depo-Medrol. A sterile dressing was applied. The patient tolerated the procedure well. Impression:   1. Sprain of left shoulder, unspecified shoulder sprain type, initial encounter    2. Degenerative joint disease of left acromioclavicular joint    3. Os acromiale of left shoulder    4. Cervicalgia        Rule out internal derangement left shoulder   Nicotine addiction   Status post arthroscopy right shoulder ADCR extensive remote glenohumeral joint subacromial space mini open biceps tenodesis 4 years    Plan:   I discussed the problem with the patient. I discussed nonoperative versus operative intervention including injections. We will defer surgery for now. I injected her left shoulder. I gave her prescriptions for Voltaren, Voltaren gel and gabapentin. We will put her in supervised physical therapy for range of motion strengthening exercises left shoulder including dry needling. I will provide her with an out of work note. I would like to obtain an MRI of the left shoulder. I will recheck her back following the MRI of the left shoulder  4 This is an acute complicated injury    Follow up: No follow-ups on file.          Copy this note to her Workmen's Compensation     Maciel Khan MD

## 2022-06-23 DIAGNOSIS — S43.402A SPRAIN OF LEFT SHOULDER, UNSPECIFIED SHOULDER SPRAIN TYPE, INITIAL ENCOUNTER: ICD-10-CM

## 2022-07-05 ENCOUNTER — TELEPHONE (OUTPATIENT)
Dept: ORTHOPEDIC SURGERY | Age: 37
End: 2022-07-05

## 2022-07-05 NOTE — TELEPHONE ENCOUNTER
Called and spoke to Ramírez, offered questionnaire for Dr. Rebekah oHrn and would give message to Dr. Rebekah Horn regarding setting up meeting.

## 2022-07-12 ENCOUNTER — OFFICE VISIT (OUTPATIENT)
Dept: ORTHOPEDIC SURGERY | Age: 37
End: 2022-07-12
Payer: COMMERCIAL

## 2022-07-12 DIAGNOSIS — M25.812 OS ACROMIALE OF LEFT SHOULDER: ICD-10-CM

## 2022-07-12 DIAGNOSIS — M54.2 CERVICALGIA: ICD-10-CM

## 2022-07-12 DIAGNOSIS — S46.012D STRAIN OF TENDON OF LEFT ROTATOR CUFF, SUBSEQUENT ENCOUNTER: Primary | ICD-10-CM

## 2022-07-12 DIAGNOSIS — S46.112A STRAIN OF LONG HEAD OF LEFT BICEPS: ICD-10-CM

## 2022-07-12 DIAGNOSIS — M19.012 DEGENERATIVE JOINT DISEASE OF LEFT ACROMIOCLAVICULAR JOINT: ICD-10-CM

## 2022-07-12 PROCEDURE — 99214 OFFICE O/P EST MOD 30 MIN: CPT | Performed by: ORTHOPAEDIC SURGERY

## 2022-07-12 RX ORDER — GABAPENTIN 100 MG/1
100 CAPSULE ORAL 3 TIMES DAILY
Qty: 90 CAPSULE | Refills: 0 | Status: SHIPPED | OUTPATIENT
Start: 2022-07-12 | End: 2022-08-11

## 2022-07-12 RX ORDER — DICLOFENAC SODIUM 75 MG/1
75 TABLET, DELAYED RELEASE ORAL 2 TIMES DAILY
Qty: 60 TABLET | Refills: 0 | Status: SHIPPED | OUTPATIENT
Start: 2022-07-12

## 2022-07-12 NOTE — PROGRESS NOTES
Name: Gillian Johnson  YOB: 1985  Gender: female  MRN: [id number]          HPI: Gillian Johnson is a 31 y.o. right-hand-dominant female seen for left shoulder problems. She returns noting that she is doing better. She still has soreness. She got the Voltaren and Voltaren gel but not the gabapentin. The injection helped    ROS/Meds/PSH/PMH/FH/SH: A ten system review of systems was performed and is negative other than what is in the HPI. Tobacco:  reports that she has been smoking. She has been smoking about 1.00 pack per day. She has never used smokeless tobacco.  There were no vitals taken for this visit. Physical Examination:  She is an awake alert pleasant female ambulating without difficulty  She has a slight restriction in cervical spine range of motion without radicular findings  She has bilateral trapezial tenderness    Her right shoulder has well-healed incisions  Some crepitance  The right shoulder has 0 to 180 degrees of active and 0 to 180 degrees passive forward elevation. Internal rotation is to T6. External rotation is to 60 degrees at the side. In the 90 degree abducted position 90 degrees of external and 90 degrees internal rotation  The AC joint is non-tender  SC joint is non-tender. Greater tuberosity is non-tender. negative biceps  Negative O'Briens sign  negative lift-off sign  Negative belly press sign  Negative bear huggers sign  negative drop sign  negative hornblower's sign  No posterior glenohumeral joint line tenderness. No evident excessive external rotation  Rotator cuff strength is 5/5.  negative external rotation stress test.   Negative empty can sign  There is no evident anterior or posterior apprehension with a negative sulcus sign. No instability  negative external and internal Rotation lag sign  Neurovascularly intact. The left shoulder has 0 to 140 degrees of active and 0 to 170 degrees passive forward elevation.    Pain in the overhead position  Internal rotation is to T10. External rotation is to 60 degrees at the side. In the 90 degree abducted position 90 degrees of external and 90 degrees internal rotation  The AC joint is tender  SC joint is non-tender. Greater tuberosity is tender. The bicipital stress test negative Nuno sign  Negative O'Briens sign  negative lift-off sign  Negative belly press sign  Negative bear huggers sign  negative drop sign  negative hornblower's sign  No posterior glenohumeral joint line tenderness. No evident excessive external rotation  Rotator cuff strength is 5/5. Positive external rotation stress test.   Positive empty can sign  There is no evident anterior or posterior apprehension with a negative sulcus sign. No instability  negative external and internal Rotation lag sign  Neurovascularly intact. Her acromion is nontender        Data Reviewed:      MRI left shoulder demonstrates a type II acromion. Degenerative changes in the [facility] joint. There is an os acromiale without increased signal seen in the acromion. Rotator cuff is intact. There is fluid around the biceps tendon. No atrophy. Glenohumeral articular cartilage is intact. Minor Procedure:      Impression:   1. Strain of tendon of left rotator cuff, subsequent encounter    2. Strain of long head of left biceps    3. Degenerative joint disease of left acromioclavicular joint    4. Os acromiale of left shoulder    5. Cervicalgia        Rule out internal derangement left shoulder   Nicotine addiction   Status post arthroscopy right shoulder ADCR extensive remote glenohumeral joint subacromial space mini open biceps tenodesis 4 years    Plan:   I discussed the problem with the patient. I discussed nonoperative versus operative intervention including injections. We will defer surgery for now. She is not ready for another left shoulder injection yet. I gave her a prescription for gabapentin.   We will continue physical therapy working on full motion and full strength dry needling all modalities. She can return to work. Her work restrictions include no overhead use left arm no repetitive use left arm no lifting more than 30 pounds with the left arm. I will recheck her back in 1 month  4 This is an acute complicated injury    Follow up: Return in about 1 month (around 8/12/2022).          Copy this note to her Workmen's Compensation     Christine Rodrigues MD

## 2022-07-13 ENCOUNTER — TELEPHONE (OUTPATIENT)
Dept: ORTHOPEDIC SURGERY | Age: 37
End: 2022-07-13

## 2022-07-13 NOTE — TELEPHONE ENCOUNTER
Call Shania Martinez [phone] to set up conference and also sent in a questionnaire to be filled out

## 2022-07-25 ENCOUNTER — TELEPHONE (OUTPATIENT)
Dept: ORTHOPEDIC SURGERY | Age: 37
End: 2022-07-25

## 2022-07-25 NOTE — TELEPHONE ENCOUNTER
She represents her in her WC claim. She is asking about the questionnaire they sent after her appt on July 13. If not, they may need to do a deposition. Please give her a call.

## 2022-07-25 NOTE — TELEPHONE ENCOUNTER
Called and spoke with pt who states that she was having trouble swallowing and believes this was due to the gabapentin. Pt stopped taking gabapentin on 7/21 and symptoms have improved since then. I advised pt that should these symptoms return or continue that she needs to go to the ER for evaluation. Pt agreed and will keep us updated.

## 2022-07-25 NOTE — TELEPHONE ENCOUNTER
She's been taking Gabapentin and is now having trouble swallowing while taking it. She has choked on her food 3 times. Please call to discuss.

## 2022-08-04 ENCOUNTER — TELEPHONE (OUTPATIENT)
Dept: ORTHOPEDIC SURGERY | Age: 37
End: 2022-08-04

## 2022-08-04 NOTE — TELEPHONE ENCOUNTER
Shawanda montez/Kamran  has left several messages/faxes and emails for Margot Savage and is getting no response.  Please call [phone]

## 2022-08-08 ENCOUNTER — TELEPHONE (OUTPATIENT)
Dept: ORTHOPEDIC SURGERY | Age: 37
End: 2022-08-08

## 2022-08-08 NOTE — TELEPHONE ENCOUNTER
Emailed Shawanda with law firm, Dr. Brigido Posadas does not do just a conference, they can send a questionnaire.

## 2022-08-29 DIAGNOSIS — M19.012 DEGENERATIVE JOINT DISEASE OF LEFT ACROMIOCLAVICULAR JOINT: Primary | ICD-10-CM

## 2022-08-30 RX ORDER — DICLOFENAC SODIUM 75 MG/1
75 TABLET, DELAYED RELEASE ORAL 2 TIMES DAILY
Qty: 60 TABLET | Refills: 0 | Status: SHIPPED | OUTPATIENT
Start: 2022-08-30

## 2022-09-13 ENCOUNTER — OFFICE VISIT (OUTPATIENT)
Dept: ORTHOPEDIC SURGERY | Age: 37
End: 2022-09-13

## 2022-09-13 DIAGNOSIS — S46.012D STRAIN OF TENDON OF LEFT ROTATOR CUFF, SUBSEQUENT ENCOUNTER: Primary | ICD-10-CM

## 2022-09-13 DIAGNOSIS — M19.012 DEGENERATIVE JOINT DISEASE OF LEFT ACROMIOCLAVICULAR JOINT: ICD-10-CM

## 2022-09-13 DIAGNOSIS — M25.812 OS ACROMIALE OF LEFT SHOULDER: ICD-10-CM

## 2022-09-13 DIAGNOSIS — M54.2 CERVICALGIA: ICD-10-CM

## 2022-09-13 DIAGNOSIS — S46.112A STRAIN OF LONG HEAD OF LEFT BICEPS: ICD-10-CM

## 2022-09-13 PROCEDURE — 99214 OFFICE O/P EST MOD 30 MIN: CPT | Performed by: ORTHOPAEDIC SURGERY

## 2022-09-13 NOTE — PROGRESS NOTES
Name: Farhan Amaya  YOB: 1985  Gender: female  MRN: [id number]          HPI: Farhan Amaya is a 31 y.o. right-hand-dominant female seen for left shoulder problems. She returns noting that the left shoulder is still bothering her. The injection only helped transiently. She was fired from [employer]. She has recently obtained Medicaid but cannot get the therapy until October. The left shoulder still bothers her. Her case is still in litigation regarding her work-up claim    ROS/Meds/PSH/PMH/FH/SH: A ten system review of systems was performed and is negative other than what is in the HPI. Tobacco:  reports that she has been smoking. She has been smoking an average of 1 pack per day. She has never used smokeless tobacco.  There were no vitals taken for this visit. Physical Examination:  She is an awake alert pleasant female ambulating without difficulty  She has a slight restriction in cervical spine range of motion without radicular findings  She has bilateral trapezial tenderness    Her right shoulder has well-healed incisions  Some crepitance  The right shoulder has 0 to 180 degrees of active and 0 to 180 degrees passive forward elevation. Internal rotation is to T6. External rotation is to 60 degrees at the side. In the 90 degree abducted position 90 degrees of external and 90 degrees internal rotation  The AC joint is non-tender  SC joint is non-tender. Greater tuberosity is non-tender. negative biceps  Negative O'Briens sign  negative lift-off sign  Negative belly press sign  Negative bear huggers sign  negative drop sign  negative hornblower's sign  No posterior glenohumeral joint line tenderness. No evident excessive external rotation  Rotator cuff strength is 5/5.  negative external rotation stress test.   Negative empty can sign  There is no evident anterior or posterior apprehension with a negative sulcus sign.    No instability  negative external and internal Rotation lag sign  Neurovascularly intact. The left shoulder has 0 to 140 degrees of active and 0 to 170 degrees passive forward elevation. Pain in the overhead position  Internal rotation is to T10. External rotation is to 60 degrees at the side. In the 90 degree abducted position 90 degrees of external and 90 degrees internal rotation  The AC joint is tender  SC joint is non-tender. Greater tuberosity is tender. The bicipital stress test negative Nuno sign  Negative O'Briens sign  negative lift-off sign  Negative belly press sign  Negative bear huggers sign  negative drop sign  negative hornblower's sign  No posterior glenohumeral joint line tenderness. No evident excessive external rotation  Rotator cuff strength is 5/5. Positive external rotation stress test.   Positive empty can sign  There is no evident anterior or posterior apprehension with a negative sulcus sign. No instability  negative external and internal Rotation lag sign  Neurovascularly intact. Her acromion is nontender        Data Reviewed:                 Minor Procedure:      Impression:   1. Strain of tendon of left rotator cuff, subsequent encounter    2. Strain of long head of left biceps    3. Degenerative joint disease of left acromioclavicular joint    4. Os acromiale of left shoulder    5. Cervicalgia       Rule out internal derangement left shoulder  Nicotine addiction  Status post arthroscopy right shoulder ADCR extensive remote glenohumeral joint subacromial space mini open biceps tenodesis 4 years    Plan:   I discussed the problem with the patient. I discussed nonoperative versus operative intervention including injections. We will defer further injections for now. We discussed the need for possible arthroscopic left shoulder surgery. I would like to give her a chance to at least try supervised physical therapy.   So we will give her a therapy prescription for left shoulder full motion full-strength neck exercises including dry needling. She can return to work. Her work restrictions include no overhead use left arm no repetitive use left arm no lifting more than 30 pounds with the left arm. I will recheck her back in 2 months. If she is no better we will consider arthroscopic left shoulder options. If she needs to get  4 This is an acute complicated injury    Follow up: Return in about 2 months (around 11/13/2022).          Copy this note to her Workmen's Compensation     Jasson Babcock MD

## 2022-09-27 ENCOUNTER — TELEPHONE (OUTPATIENT)
Dept: ORTHOPEDIC SURGERY | Age: 37
End: 2022-09-27

## 2022-09-27 NOTE — TELEPHONE ENCOUNTER
She was work comp and her surgery was denied. She is wondering what the cost would be with her Medicaid insurance and how much it would be with no insurance. She is needing numbers for her .

## 2022-09-27 NOTE — TELEPHONE ENCOUNTER
Kristin Merrill with [facility] law firm called to schedule a deposition.      Call back [phone]

## 2022-10-03 ENCOUNTER — TELEPHONE (OUTPATIENT)
Dept: ORTHOPEDIC SURGERY | Age: 37
End: 2022-10-03

## 2022-10-10 ENCOUNTER — TELEPHONE (OUTPATIENT)
Dept: ORTHOPEDIC SURGERY | Age: 37
End: 2022-10-10

## 2022-10-10 DIAGNOSIS — S46.012D STRAIN OF TENDON OF LEFT ROTATOR CUFF, SUBSEQUENT ENCOUNTER: Primary | ICD-10-CM

## 2022-10-10 RX ORDER — DICLOFENAC SODIUM 75 MG/1
75 TABLET, DELAYED RELEASE ORAL 2 TIMES DAILY
Qty: 60 TABLET | Refills: 0 | Status: SHIPPED | OUTPATIENT
Start: 2022-10-10 | End: 2022-11-09

## 2022-10-24 ENCOUNTER — TELEPHONE (OUTPATIENT)
Dept: ORTHOPEDIC SURGERY | Age: 37
End: 2022-10-24

## 2022-10-24 NOTE — TELEPHONE ENCOUNTER
Patient would like a phone call back, she is concerned for her finger, states it is purple and completely numb.

## 2022-10-24 NOTE — TELEPHONE ENCOUNTER
Called and spoke with patient. She says this same thing has happened in the past with her previous surgery that Dr. Titus Cuevas did, the episode does not last forever, just 5-10 minutes.  Will move her appointment up to this Wednesday 10- at 1:15pm.

## 2022-10-26 ENCOUNTER — OFFICE VISIT (OUTPATIENT)
Dept: ORTHOPEDIC SURGERY | Age: 37
End: 2022-10-26
Payer: MEDICAID

## 2022-10-26 DIAGNOSIS — S46.012D STRAIN OF TENDON OF LEFT ROTATOR CUFF, SUBSEQUENT ENCOUNTER: Primary | ICD-10-CM

## 2022-10-26 DIAGNOSIS — S46.112A STRAIN OF LONG HEAD OF LEFT BICEPS: ICD-10-CM

## 2022-10-26 DIAGNOSIS — M19.012 DEGENERATIVE JOINT DISEASE OF LEFT ACROMIOCLAVICULAR JOINT: ICD-10-CM

## 2022-10-26 DIAGNOSIS — M25.812 OS ACROMIALE OF LEFT SHOULDER: ICD-10-CM

## 2022-10-26 DIAGNOSIS — M54.2 CERVICALGIA: ICD-10-CM

## 2022-10-26 PROCEDURE — 99214 OFFICE O/P EST MOD 30 MIN: CPT | Performed by: ORTHOPAEDIC SURGERY

## 2022-10-26 NOTE — PROGRESS NOTES
Name: Lorraine Romano  YOB: 1985  Gender: female  MRN: [id number]          HPI: Lorraine Romano is a 31 y.o. right-hand-dominant female seen for left shoulder problems. She returns noting that the left shoulder is still bothering her. The injection only helped transiently. She was fired from [employer]. She has recently obtained Medicaid but cannot get the therapy until October. The left shoulder still bothers her. Her case is still in litigation regarding her work-up claim. She has tried physical therapy. The left shoulder still bothers her. There is no improvement. It is affecting her quality of life. ROS/Meds/PSH/PMH/FH/SH: A ten system review of systems was performed and is negative other than what is in the HPI. Tobacco:  reports that she has been smoking. She has been smoking an average of 1 pack per day. She has never used smokeless tobacco.  There were no vitals taken for this visit. Physical Examination:  She is an awake alert pleasant female ambulating without difficulty  She has a slight restriction in cervical spine range of motion without radicular findings  She has bilateral trapezial tenderness    Her right shoulder has well-healed incisions  Some crepitance  The right shoulder has 0 to 180 degrees of active and 0 to 180 degrees passive forward elevation. Internal rotation is to T6. External rotation is to 60 degrees at the side. In the 90 degree abducted position 90 degrees of external and 90 degrees internal rotation  The AC joint is non-tender  SC joint is non-tender. Greater tuberosity is non-tender. negative biceps  Negative O'Briens sign  negative lift-off sign  Negative belly press sign  Negative bear huggers sign  negative drop sign  negative hornblower's sign  No posterior glenohumeral joint line tenderness.    No evident excessive external rotation  Rotator cuff strength is 5/5.  negative external rotation stress test.   Negative empty can sign  There is no evident anterior or posterior apprehension with a negative sulcus sign. No instability  negative external and internal Rotation lag sign  Neurovascularly intact. The left shoulder has 0 to 140 degrees of active and 0 to 170 degrees passive forward elevation. Pain in the overhead position  Internal rotation is to T10. External rotation is to 60 degrees at the side. In the 90 degree abducted position 90 degrees of external and 90 degrees internal rotation  The AC joint is tender  SC joint is non-tender. Greater tuberosity is tender. Markedly positive bicipital stress test negative Nuno sign  Negative O'Briens sign  negative lift-off sign  Negative belly press sign  Negative bear huggers sign  negative drop sign  negative hornblower's sign  No posterior glenohumeral joint line tenderness. No evident excessive external rotation  Rotator cuff strength is 5/5. Positive external rotation stress test.   Positive empty can sign  There is no evident anterior or posterior apprehension with a negative sulcus sign. No instability  negative external and internal Rotation lag sign  Neurovascularly intact. Her acromion is nontender        Data Reviewed:                 Minor Procedure:      Impression:   1. Strain of tendon of left rotator cuff, subsequent encounter    2. Strain of long head of left biceps    3. Degenerative joint disease of left acromioclavicular joint    4. Os acromiale of left shoulder    5. Cervicalgia       Rule out internal derangement left shoulder  Nicotine addiction  Status post arthroscopy right shoulder ADCR extensive remote glenohumeral joint subacromial space mini open biceps tenodesis 4 years    Plan:   I discussed the problem with the patient. I discussed nonoperative versus operative intervention including injections. She has not improved to date and her left shoulder is affecting her quality of life. In my opinion she has exhausted nonoperative modalities. She would be a candidate for an arthroscopy left shoulder ASD without acromioplasty,  ADCR, extensive debridement SLAP tear, biceps labral complex, glenohumeral joint, subacromial space, mini open rotator cuff repair and biceps tenodesis. This would be performed utilizing general anesthesia with an interscalene block on an outpatient basis. I would measure a hemoglobin A1c and a fasting blood glucose. I discussed the risks and benefits of the procedure with her and expected outcomes. She can return to work. Her work restrictions include no overhead use left arm no repetitive use left arm no lifting more than 30 pounds with the left arm. I discussed risks and benefits of the procedure with her and expected outcomes. We will proceed as outlined above    4. Acute complicated injury    X37.000  S46.112  m25.812  M19.012      Follow up: No follow-ups on file.          Copy this note to her Workmen's Compensation     Wilma Wagner MD

## 2022-11-01 ENCOUNTER — TELEPHONE (OUTPATIENT)
Dept: ORTHOPEDIC SURGERY | Age: 37
End: 2022-11-01

## 2022-11-01 NOTE — TELEPHONE ENCOUNTER
Rina Dill is calling to confirm the deposition for tomorrow 11/2 at 3:00 at our office. Please call or reply to the email that was sent.

## 2022-11-14 ENCOUNTER — TELEPHONE (OUTPATIENT)
Dept: ORTHOPEDIC SURGERY | Age: 37
End: 2022-11-14

## 2022-11-14 DIAGNOSIS — S46.012D STRAIN OF TENDON OF LEFT ROTATOR CUFF, SUBSEQUENT ENCOUNTER: Primary | ICD-10-CM

## 2022-11-14 RX ORDER — DICLOFENAC SODIUM 75 MG/1
75 TABLET, DELAYED RELEASE ORAL 2 TIMES DAILY
Qty: 60 TABLET | Refills: 0 | Status: ON HOLD | OUTPATIENT
Start: 2022-11-14 | End: 2022-11-18 | Stop reason: HOSPADM

## 2022-11-16 DIAGNOSIS — S46.012D STRAIN OF TENDON OF LEFT ROTATOR CUFF, SUBSEQUENT ENCOUNTER: Primary | ICD-10-CM

## 2022-11-16 PROBLEM — S46.012A STRAIN OF TENDON OF LEFT ROTATOR CUFF: Status: ACTIVE | Noted: 2022-11-16

## 2022-11-16 PROBLEM — M19.012 DEGENERATIVE JOINT DISEASE OF LEFT ACROMIOCLAVICULAR JOINT: Status: ACTIVE | Noted: 2022-11-16

## 2022-11-16 PROBLEM — M25.812 OS ACROMIALE OF LEFT SHOULDER: Status: ACTIVE | Noted: 2022-11-16

## 2022-11-16 PROBLEM — S46.112A STRAIN OF LONG HEAD OF LEFT BICEPS: Status: ACTIVE | Noted: 2022-11-16

## 2022-11-16 NOTE — H&P
Subjective:     Patient is a 30 y.o. RHD FEMALE WITH LEFT SHOULDER PAIN. SEE OFFICE NOTE. Patient Active Problem List    Diagnosis Date Noted    Strain of tendon of left rotator cuff 11/16/2022    Strain of long head of left biceps 11/16/2022    Degenerative joint disease of left acromioclavicular joint 11/16/2022    Os acromiale of left shoulder 11/16/2022    Degenerative joint disease of right acromioclavicular joint 07/31/2018    Os acromiale, right 07/31/2018    Rupture of right long head biceps tendon 07/31/2018     Past Medical History:   Diagnosis Date    Current smoker     Primary osteoarthritis, right shoulder     Superior glenoid labrum lesion of right shoulder       Past Surgical History:   Procedure Laterality Date    CYST REMOVAL      TONSILLECTOMY        Prior to Admission medications    Medication Sig Start Date End Date Taking? Authorizing Provider   diclofenac (VOLTAREN) 75 MG EC tablet Take 1 tablet by mouth 2 times daily 11/14/22 12/14/22  Jean-Paul Garza MD   diclofenac (VOLTAREN) 75 MG EC tablet Take 1 tablet by mouth 2 times daily 10/10/22 11/9/22  Jean-Paul Garza MD   diclofenac (VOLTAREN) 75 MG EC tablet Take 1 tablet by mouth 2 times daily 8/30/22   Jean-Paul Garza MD   diclofenac (VOLTAREN) 75 MG EC tablet Take 1 tablet by mouth 2 times daily 7/12/22   Jean-Paul Garza MD   gabapentin (NEURONTIN) 100 MG capsule Take 1 capsule by mouth 3 times daily for 30 days.  7/12/22 8/11/22  Jean-Paul Garza MD   diclofenac sodium (VOLTAREN) 1 % GEL Apply 2 g topically 2 times daily as needed for Pain 6/20/22   Jean-Paul Garza MD   diclofenac (VOLTAREN) 75 MG EC tablet Take 1 tablet by mouth 2 times daily 6/20/22   Jean-Paul Garza MD     No Known Allergies   Social History     Tobacco Use    Smoking status: Every Day     Packs/day: 1.00     Types: Cigarettes    Smokeless tobacco: Never   Substance Use Topics    Alcohol use: Yes      Family History   Problem Relation Age of Onset Diabetes Father     Heart Disease Father       Review of Systems  Pertinent items are noted in HPI. Objective:     No data found. There were no vitals taken for this visit. General Appearance:    Alert, cooperative, no distress, appears stated age   Head:    Normocephalic, without obvious abnormality, atraumatic                       Back:     Symmetric, no curvature, ROM normal, no CVA tenderness   Lungs:     Clear to auscultation bilaterally, respirations unlabored   Chest Wall:    No tenderness or deformity    Heart:    Regular rate and rhythm, S1 and S2 normal, no murmur, rub   or gallop                   Extremities:   Extremities normal, atraumatic, no cyanosis or edema   Pulses:   2+ and symmetric all extremities   Skin:   Skin color, texture, turgor normal, no rashes or lesions   Lymph nodes:   Cervical, supraclavicular, and axillary nodes normal   Neurologic:   CNII-XII intact, normal strength, sensation and reflexes     throughout           Assessment:     Active Problems:    Strain of tendon of left rotator cuff    Strain of long head of left biceps    Degenerative joint disease of left acromioclavicular joint    Os acromiale of left shoulder  Resolved Problems:    * No resolved hospital problems. *      Plan:     The various methods of treatment have been discussed with the patient and family. PATIENT HAS EXHAUSTED NON-OPERATIVE MODALITIES     After consideration of risks, benefits and other options for treatment, the patient has consented to surgical intervention.     SEE OFFICE NOTE    Vahe Booth MD

## 2022-11-17 NOTE — PERIOP NOTE
Patient verified name and . Order for consent not found in EHR; patient verifies procedure. Type 1B surgery, phone assessment complete. Orders not received. Labs per surgeon: no orders  Labs per anesthesia protocol: none    Patient answered medical/surgical history questions at their best of ability. All prior to admission medications documented in [facility]. Patient instructed to take the following medications the day of surgery according to anesthesia guidelines with a small sip of water: NONE. Hold all vitamins, supplements, herbals, NSAIDs, ASA starting now. Patient instructed on the following:    > Arrive at [facility], time of arrival to be called the day before by 1700  > NPO after midnight, unless otherwise indicated, including gum, mints, and ice chips  > Responsible adult must drive patient to the hospital, stay during surgery, and patient will need supervision 24 hours after anesthesia  > Use anti-bacterial soap in shower the night before surgery and on the morning of surgery  > All piercings must be removed prior to arrival.    > Leave all valuables (money and jewelry) at home but bring insurance card and ID on DOS.   > You may be required to pay a deductible or co-pay on the day of your procedure. You can pre-pay by calling [phone] if your surgery is at the [facility] [place] or [phone] if your surgery is at the [facility]. > Do not wear make-up, nail polish, lotions, cologne, perfumes, powders, or oil on skin. Artificial nails are not permitted.

## 2022-11-18 ENCOUNTER — HOSPITAL ENCOUNTER (OUTPATIENT)
Age: 37
Setting detail: OUTPATIENT SURGERY
Discharge: HOME | End: 2022-11-18
Attending: ORTHOPAEDIC SURGERY | Admitting: ORTHOPAEDIC SURGERY
Payer: MEDICAID

## 2022-11-18 ENCOUNTER — ANESTHESIA EVENT (OUTPATIENT)
Dept: SURGERY | Age: 37
End: 2022-11-18
Payer: MEDICAID

## 2022-11-18 ENCOUNTER — ANESTHESIA (OUTPATIENT)
Dept: SURGERY | Age: 37
End: 2022-11-18
Payer: MEDICAID

## 2022-11-18 ENCOUNTER — PREP FOR PROCEDURE (OUTPATIENT)
Dept: ORTHOPEDIC SURGERY | Age: 37
End: 2022-11-18

## 2022-11-18 ENCOUNTER — APPOINTMENT (OUTPATIENT)
Dept: GENERAL RADIOLOGY | Age: 37
End: 2022-11-18
Attending: ORTHOPAEDIC SURGERY
Payer: MEDICAID

## 2022-11-18 VITALS
OXYGEN SATURATION: 93 % | RESPIRATION RATE: 16 BRPM | DIASTOLIC BLOOD PRESSURE: 65 MMHG | HEIGHT: 66 IN | SYSTOLIC BLOOD PRESSURE: 119 MMHG | TEMPERATURE: 97.2 F | BODY MASS INDEX: 25.39 KG/M2 | HEART RATE: 64 BPM | WEIGHT: 158 LBS

## 2022-11-18 DIAGNOSIS — M19.012 DEGENERATIVE JOINT DISEASE OF LEFT ACROMIOCLAVICULAR JOINT: ICD-10-CM

## 2022-11-18 DIAGNOSIS — S46.012D STRAIN OF TENDON OF LEFT ROTATOR CUFF, SUBSEQUENT ENCOUNTER: Primary | ICD-10-CM

## 2022-11-18 DIAGNOSIS — M25.812 OS ACROMIALE OF LEFT SHOULDER: ICD-10-CM

## 2022-11-18 DIAGNOSIS — S46.112A STRAIN OF LONG HEAD OF LEFT BICEPS: ICD-10-CM

## 2022-11-18 PROBLEM — S43.432A SUPERIOR GLENOID LABRUM LESION OF LEFT SHOULDER: Status: ACTIVE | Noted: 2022-11-18

## 2022-11-18 LAB
EST. AVERAGE GLUCOSE BLD GHB EST-MCNC: 105 MG/DL
GLUCOSE BLD STRIP.AUTO-MCNC: 87 MG/DL (ref 65–100)
HBA1C MFR BLD: 5.3 % (ref 4.8–5.6)
SERVICE CMNT-IMP: NORMAL

## 2022-11-18 PROCEDURE — 2500000003 HC RX 250 WO HCPCS: Performed by: NURSE ANESTHETIST, CERTIFIED REGISTERED

## 2022-11-18 PROCEDURE — C1713 ANCHOR/SCREW BN/BN,TIS/BN: HCPCS | Performed by: ORTHOPAEDIC SURGERY

## 2022-11-18 PROCEDURE — 76942 ECHO GUIDE FOR BIOPSY: CPT | Performed by: ANESTHESIOLOGY

## 2022-11-18 PROCEDURE — 6360000002 HC RX W HCPCS: Performed by: ANESTHESIOLOGY

## 2022-11-18 PROCEDURE — 83036 HEMOGLOBIN GLYCOSYLATED A1C: CPT

## 2022-11-18 PROCEDURE — 73030 X-RAY EXAM OF SHOULDER: CPT

## 2022-11-18 PROCEDURE — 29824 SHO ARTHRS SRG DSTL CLAVICLC: CPT | Performed by: ORTHOPAEDIC SURGERY

## 2022-11-18 PROCEDURE — 29823 SHO ARTHRS SRG XTNSV DBRDMT: CPT | Performed by: ORTHOPAEDIC SURGERY

## 2022-11-18 PROCEDURE — 2580000003 HC RX 258: Performed by: ANESTHESIOLOGY

## 2022-11-18 PROCEDURE — 7100000000 HC PACU RECOVERY - FIRST 15 MIN: Performed by: ORTHOPAEDIC SURGERY

## 2022-11-18 PROCEDURE — 7100000001 HC PACU RECOVERY - ADDTL 15 MIN: Performed by: ORTHOPAEDIC SURGERY

## 2022-11-18 PROCEDURE — 2709999900 HC NON-CHARGEABLE SUPPLY: Performed by: ORTHOPAEDIC SURGERY

## 2022-11-18 PROCEDURE — 6360000002 HC RX W HCPCS: Performed by: ORTHOPAEDIC SURGERY

## 2022-11-18 PROCEDURE — 2500000003 HC RX 250 WO HCPCS: Performed by: ANESTHESIOLOGY

## 2022-11-18 PROCEDURE — 7100000010 HC PHASE II RECOVERY - FIRST 15 MIN: Performed by: ORTHOPAEDIC SURGERY

## 2022-11-18 PROCEDURE — 6360000002 HC RX W HCPCS: Performed by: NURSE ANESTHETIST, CERTIFIED REGISTERED

## 2022-11-18 PROCEDURE — 3700000001 HC ADD 15 MINUTES (ANESTHESIA): Performed by: ORTHOPAEDIC SURGERY

## 2022-11-18 PROCEDURE — 82962 GLUCOSE BLOOD TEST: CPT

## 2022-11-18 PROCEDURE — 2500000003 HC RX 250 WO HCPCS: Performed by: ORTHOPAEDIC SURGERY

## 2022-11-18 PROCEDURE — 3700000000 HC ANESTHESIA ATTENDED CARE: Performed by: ORTHOPAEDIC SURGERY

## 2022-11-18 PROCEDURE — 3600000014 HC SURGERY LEVEL 4 ADDTL 15MIN: Performed by: ORTHOPAEDIC SURGERY

## 2022-11-18 PROCEDURE — 7100000011 HC PHASE II RECOVERY - ADDTL 15 MIN: Performed by: ORTHOPAEDIC SURGERY

## 2022-11-18 PROCEDURE — 3600000004 HC SURGERY LEVEL 4 BASE: Performed by: ORTHOPAEDIC SURGERY

## 2022-11-18 PROCEDURE — 23430 REPAIR BICEPS TENDON: CPT | Performed by: ORTHOPAEDIC SURGERY

## 2022-11-18 DEVICE — 5.5MM PEEK ZIP SUTURE ANCHOR WITH ¿ CIRCLE TAPER NEEDLES, #2 FORCE FIBER
Type: IMPLANTABLE DEVICE | Site: SHOULDER | Status: FUNCTIONAL
Brand: PEEK ZIP

## 2022-11-18 DEVICE — ICONIX 2 NEEDLES WITH INTELLIBRAID TECHNOLOGY, 2.3MM ANCHOR WITH 2 STRANDS #2 FORCE FIBER
Type: IMPLANTABLE DEVICE | Site: SHOULDER | Status: FUNCTIONAL
Brand: ICONIX

## 2022-11-18 RX ORDER — MIDAZOLAM HYDROCHLORIDE 2 MG/2ML
4 INJECTION, SOLUTION INTRAMUSCULAR; INTRAVENOUS
Status: COMPLETED | OUTPATIENT
Start: 2022-11-18 | End: 2022-11-18

## 2022-11-18 RX ORDER — SODIUM CHLORIDE 0.9 % (FLUSH) 0.9 %
5-40 SYRINGE (ML) INJECTION EVERY 12 HOURS SCHEDULED
Status: DISCONTINUED | OUTPATIENT
Start: 2022-11-18 | End: 2022-11-18 | Stop reason: HOSPADM

## 2022-11-18 RX ORDER — SODIUM CHLORIDE 0.9 % (FLUSH) 0.9 %
5-40 SYRINGE (ML) INJECTION PRN
Status: CANCELLED | OUTPATIENT
Start: 2022-11-18

## 2022-11-18 RX ORDER — PROMETHAZINE HYDROCHLORIDE 25 MG/1
25 TABLET ORAL EVERY 6 HOURS PRN
Qty: 20 TABLET | Refills: 0 | Status: SHIPPED | OUTPATIENT
Start: 2022-11-18 | End: 2022-11-23

## 2022-11-18 RX ORDER — OXYCODONE HYDROCHLORIDE 5 MG/1
5 TABLET ORAL PRN
Status: DISCONTINUED | OUTPATIENT
Start: 2022-11-18 | End: 2022-11-18 | Stop reason: HOSPADM

## 2022-11-18 RX ORDER — GLYCOPYRROLATE 0.2 MG/ML
INJECTION INTRAMUSCULAR; INTRAVENOUS PRN
Status: DISCONTINUED | OUTPATIENT
Start: 2022-11-18 | End: 2022-11-18 | Stop reason: SDUPTHER

## 2022-11-18 RX ORDER — NEOSTIGMINE METHYLSULFATE 1 MG/ML
INJECTION, SOLUTION INTRAVENOUS PRN
Status: DISCONTINUED | OUTPATIENT
Start: 2022-11-18 | End: 2022-11-18 | Stop reason: SDUPTHER

## 2022-11-18 RX ORDER — SODIUM CHLORIDE 0.9 % (FLUSH) 0.9 %
5-40 SYRINGE (ML) INJECTION PRN
Status: DISCONTINUED | OUTPATIENT
Start: 2022-11-18 | End: 2022-11-18 | Stop reason: HOSPADM

## 2022-11-18 RX ORDER — DEXAMETHASONE SODIUM PHOSPHATE 10 MG/ML
INJECTION INTRAMUSCULAR; INTRAVENOUS PRN
Status: DISCONTINUED | OUTPATIENT
Start: 2022-11-18 | End: 2022-11-18 | Stop reason: SDUPTHER

## 2022-11-18 RX ORDER — HYDROMORPHONE HYDROCHLORIDE 1 MG/ML
0.5 INJECTION, SOLUTION INTRAMUSCULAR; INTRAVENOUS; SUBCUTANEOUS EVERY 5 MIN PRN
Status: DISCONTINUED | OUTPATIENT
Start: 2022-11-18 | End: 2022-11-18 | Stop reason: HOSPADM

## 2022-11-18 RX ORDER — LIDOCAINE HYDROCHLORIDE 20 MG/ML
INJECTION, SOLUTION EPIDURAL; INFILTRATION; INTRACAUDAL; PERINEURAL PRN
Status: DISCONTINUED | OUTPATIENT
Start: 2022-11-18 | End: 2022-11-18 | Stop reason: SDUPTHER

## 2022-11-18 RX ORDER — KETOROLAC TROMETHAMINE 10 MG/1
TABLET, FILM COATED ORAL
Qty: 20 TABLET | Refills: 0 | Status: SHIPPED | OUTPATIENT
Start: 2022-11-18

## 2022-11-18 RX ORDER — PROPOFOL 10 MG/ML
INJECTION, EMULSION INTRAVENOUS PRN
Status: DISCONTINUED | OUTPATIENT
Start: 2022-11-18 | End: 2022-11-18 | Stop reason: SDUPTHER

## 2022-11-18 RX ORDER — SODIUM CHLORIDE, SODIUM LACTATE, POTASSIUM CHLORIDE, CALCIUM CHLORIDE 600; 310; 30; 20 MG/100ML; MG/100ML; MG/100ML; MG/100ML
100 INJECTION, SOLUTION INTRAVENOUS CONTINUOUS
Status: DISCONTINUED | OUTPATIENT
Start: 2022-11-18 | End: 2022-11-18 | Stop reason: HOSPADM

## 2022-11-18 RX ORDER — ROPIVACAINE HYDROCHLORIDE 10 MG/ML
INJECTION EPIDURAL; INFILTRATION; PERINEURAL PRN
Status: DISCONTINUED | OUTPATIENT
Start: 2022-11-18 | End: 2022-11-18 | Stop reason: SDUPTHER

## 2022-11-18 RX ORDER — FENTANYL CITRATE 50 UG/ML
100 INJECTION, SOLUTION INTRAMUSCULAR; INTRAVENOUS
Status: COMPLETED | OUTPATIENT
Start: 2022-11-18 | End: 2022-11-18

## 2022-11-18 RX ORDER — GABAPENTIN 100 MG/1
100 CAPSULE ORAL 3 TIMES DAILY
Qty: 90 CAPSULE | Refills: 0 | Status: SHIPPED | OUTPATIENT
Start: 2022-11-18 | End: 2022-12-18

## 2022-11-18 RX ORDER — LIDOCAINE HYDROCHLORIDE 10 MG/ML
1 INJECTION, SOLUTION INFILTRATION; PERINEURAL
Status: COMPLETED | OUTPATIENT
Start: 2022-11-18 | End: 2022-11-18

## 2022-11-18 RX ORDER — OXYCODONE HYDROCHLORIDE 5 MG/1
10 TABLET ORAL PRN
Status: DISCONTINUED | OUTPATIENT
Start: 2022-11-18 | End: 2022-11-18 | Stop reason: HOSPADM

## 2022-11-18 RX ORDER — HYDROMORPHONE HYDROCHLORIDE 2 MG/1
2 TABLET ORAL EVERY 6 HOURS PRN
Qty: 40 TABLET | Refills: 0 | Status: SHIPPED | OUTPATIENT
Start: 2022-11-18 | End: 2022-11-28

## 2022-11-18 RX ORDER — SODIUM CHLORIDE 0.9 % (FLUSH) 0.9 %
5-40 SYRINGE (ML) INJECTION EVERY 12 HOURS SCHEDULED
Status: CANCELLED | OUTPATIENT
Start: 2022-11-18

## 2022-11-18 RX ORDER — SODIUM CHLORIDE 9 MG/ML
INJECTION, SOLUTION INTRAVENOUS PRN
Status: DISCONTINUED | OUTPATIENT
Start: 2022-11-18 | End: 2022-11-18 | Stop reason: HOSPADM

## 2022-11-18 RX ORDER — LIDOCAINE HYDROCHLORIDE AND EPINEPHRINE BITARTRATE 20; .01 MG/ML; MG/ML
INJECTION, SOLUTION SUBCUTANEOUS PRN
Status: DISCONTINUED | OUTPATIENT
Start: 2022-11-18 | End: 2022-11-18 | Stop reason: HOSPADM

## 2022-11-18 RX ORDER — ONDANSETRON 2 MG/ML
INJECTION INTRAMUSCULAR; INTRAVENOUS PRN
Status: DISCONTINUED | OUTPATIENT
Start: 2022-11-18 | End: 2022-11-18 | Stop reason: SDUPTHER

## 2022-11-18 RX ORDER — SODIUM CHLORIDE, SODIUM LACTATE, POTASSIUM CHLORIDE, CALCIUM CHLORIDE 600; 310; 30; 20 MG/100ML; MG/100ML; MG/100ML; MG/100ML
INJECTION, SOLUTION INTRAVENOUS CONTINUOUS
Status: DISCONTINUED | OUTPATIENT
Start: 2022-11-18 | End: 2022-11-18 | Stop reason: HOSPADM

## 2022-11-18 RX ORDER — ROCURONIUM BROMIDE 10 MG/ML
INJECTION, SOLUTION INTRAVENOUS PRN
Status: DISCONTINUED | OUTPATIENT
Start: 2022-11-18 | End: 2022-11-18 | Stop reason: SDUPTHER

## 2022-11-18 RX ORDER — ONDANSETRON 2 MG/ML
4 INJECTION INTRAMUSCULAR; INTRAVENOUS
Status: DISCONTINUED | OUTPATIENT
Start: 2022-11-18 | End: 2022-11-18 | Stop reason: HOSPADM

## 2022-11-18 RX ORDER — SODIUM CHLORIDE 9 MG/ML
INJECTION, SOLUTION INTRAVENOUS PRN
Status: CANCELLED | OUTPATIENT
Start: 2022-11-18

## 2022-11-18 RX ADMIN — MIDAZOLAM 3 MG: 1 INJECTION INTRAMUSCULAR; INTRAVENOUS at 08:55

## 2022-11-18 RX ADMIN — Medication 2 G: at 09:45

## 2022-11-18 RX ADMIN — ONDANSETRON 4 MG: 2 INJECTION INTRAMUSCULAR; INTRAVENOUS at 09:50

## 2022-11-18 RX ADMIN — LIDOCAINE HYDROCHLORIDE 100 MG: 20 INJECTION, SOLUTION EPIDURAL; INFILTRATION; INTRACAUDAL; PERINEURAL at 09:34

## 2022-11-18 RX ADMIN — PROPOFOL 200 MG: 10 INJECTION, EMULSION INTRAVENOUS at 09:34

## 2022-11-18 RX ADMIN — ROPIVACAINE HYDROCHLORIDE 15 ML: 10 INJECTION, SOLUTION EPIDURAL at 08:55

## 2022-11-18 RX ADMIN — GLYCOPYRROLATE 0.4 MG: 0.2 INJECTION, SOLUTION INTRAMUSCULAR; INTRAVENOUS at 10:29

## 2022-11-18 RX ADMIN — FENTANYL CITRATE 100 MCG: 50 INJECTION INTRAMUSCULAR; INTRAVENOUS at 08:55

## 2022-11-18 RX ADMIN — SODIUM CHLORIDE, POTASSIUM CHLORIDE, SODIUM LACTATE AND CALCIUM CHLORIDE 100 ML/HR: 600; 310; 30; 20 INJECTION, SOLUTION INTRAVENOUS at 07:50

## 2022-11-18 RX ADMIN — Medication 3 MG: at 10:29

## 2022-11-18 RX ADMIN — LIDOCAINE HYDROCHLORIDE 1 ML: 10 INJECTION, SOLUTION INFILTRATION; PERINEURAL at 07:50

## 2022-11-18 RX ADMIN — DEXAMETHASONE SODIUM PHOSPHATE 8 MG: 10 INJECTION INTRAMUSCULAR; INTRAVENOUS at 09:50

## 2022-11-18 RX ADMIN — ROCURONIUM BROMIDE 40 MG: 50 INJECTION, SOLUTION INTRAVENOUS at 09:34

## 2022-11-18 RX ADMIN — MEPIVACAINE HYDROCHLORIDE 15 ML: 15 INJECTION, SOLUTION EPIDURAL; INFILTRATION at 08:55

## 2022-11-18 ASSESSMENT — PAIN - FUNCTIONAL ASSESSMENT: PAIN_FUNCTIONAL_ASSESSMENT: 0-10

## 2022-11-18 ASSESSMENT — PAIN DESCRIPTION - DESCRIPTORS: DESCRIPTORS: ACHING

## 2022-11-18 ASSESSMENT — LIFESTYLE VARIABLES: SMOKING_STATUS: 1

## 2022-11-18 NOTE — ANESTHESIA PRE PROCEDURE
Department of Anesthesiology  Preprocedure Note       Name:  Dario Quintanilla   Age:  30 y.o.  :  1985                                          MRN:  [id number]         Date:  2022      Surgeon: Abram Pryor):  Jean-Paul Garza MD    Procedure: Procedure(s):  left shoulder arthroscopy, arthroscopic subacromial decompression without acromioplasty, arthroscopic distal clavicle resection, arthroscopic versus mini open rotator cuff repair and biceps tenodesis. general/interscalene    Medications prior to admission:   Prior to Admission medications    Medication Sig Start Date End Date Taking? Authorizing Provider   HYDROmorphone (DILAUDID) 2 MG tablet Take 1 tablet by mouth every 6 hours as needed for Pain for up to 10 days. 22 Yes Jean-Paul Garza MD   gabapentin (NEURONTIN) 100 MG capsule Take 1 capsule by mouth 3 times daily for 30 days. 22 Yes Jean-Paul Garza MD   promethazine (PHENERGAN) 25 MG tablet Take 1 tablet by mouth every 6 hours as needed for Nausea 22 Yes Jean-Paul Garza MD   ketorolac (TORADOL) 10 MG tablet Take 1 tablet by mouth every 6 hours for 5 days post-op 22  Yes Jean-Paul Garza MD   gabapentin (NEURONTIN) 100 MG capsule Take 1 capsule by mouth 3 times daily for 30 days.  22  Jean-Paul Garza MD       Current medications:    Current Facility-Administered Medications   Medication Dose Route Frequency Provider Last Rate Last Admin    lidocaine 1 % injection 1 mL  1 mL IntraDERmal Once PRN Arthur Rodríguez MD        fentaNYL (SUBLIMAZE) injection 100 mcg  100 mcg IntraVENous Once PRN Emaskylar Deluca., MD        lactated ringers infusion  100 mL/hr IntraVENous Continuous Arthur Deluca., MD        midazolam PF (VERSED) injection 4 mg  4 mg IntraVENous Once PRN Emaskylar Deluca., MD        ceFAZolin (ANCEF) 2000 mg in sterile water 20 mL IV syringe  2,000 mg IntraVENous On Call to [place] [id number]., MD        sodium chloride flush 0.9 % injection 5-40 mL  5-40 mL IntraVENous 2 times per day Hans Matson MD        sodium chloride flush 0.9 % injection 5-40 mL  5-40 mL IntraVENous PRN Hans Matson MD        0.9 % sodium chloride infusion   IntraVENous PRN Hans Matson MD           Allergies:  No Known Allergies    Problem List:    Patient Active Problem List   Diagnosis Code    Degenerative joint disease of right acromioclavicular joint M19.011    Os acromiale, right M25.811    Rupture of right long head biceps tendon S46.111A    Strain of tendon of left rotator cuff S50.80A    Strain of long head of left biceps S46.112A    Os acromiale of left shoulder M25.812    Degenerative joint disease of left acromioclavicular joint M19.012       Past Medical History:        Diagnosis Date    Current smoker     Primary osteoarthritis, right shoulder     PTSD (post-traumatic stress disorder)     Superior glenoid labrum lesion of right shoulder        Past Surgical History:        Procedure Laterality Date    CYST REMOVAL      SHOULDER ARTHROSCOPY Right 2018    TONSILLECTOMY         Social History:    Social History     Tobacco Use    Smoking status: Every Day     Packs/day: 1.00     Types: Cigarettes    Smokeless tobacco: Never   Substance Use Topics    Alcohol use:  Yes                                Ready to quit: Not Answered  Counseling given: Not Answered      Vital Signs (Current):   Vitals:    11/17/22 0759 11/18/22 0715   BP:  104/61   Pulse:  63   Resp:  18   Temp:  98.4 °F (36.9 °C)   TempSrc:  Temporal   SpO2:  98%   Weight: 143 lb (64.9 kg) 158 lb (71.7 kg)   Height: 5' 6\" (1.676 m)                                               BP Readings from Last 3 Encounters:   11/18/22 104/61       NPO Status:                                                                                 BMI:   Wt Readings from Last 3 Encounters:   11/18/22 158 lb (71.7 kg)   06/20/22 143 lb 6.4 oz (65 kg) Body mass index is 25.5 kg/m². CBC: No results found for: WBC, RBC, HGB, HCT, MCV, RDW, PLT    CMP: No results found for: NA, K, CL, CO2, BUN, CREATININE, GFRAA, AGRATIO, LABGLOM, GLUCOSE, GLU, PROT, CALCIUM, BILITOT, ALKPHOS, AST, ALT    POC Tests: No results for input(s): POCGLU, POCNA, POCK, POCCL, POCBUN, POCHEMO, POCHCT in the last 72 hours. Coags: No results found for: PROTIME, INR, APTT    HCG (If Applicable): No results found for: PREGTESTUR, PREGSERUM, HCG, HCGQUANT     ABGs: No results found for: PHART, PO2ART, GLC1UNY, DOT9HBB, BEART, M5LLCMBZ     Type & Screen (If Applicable):  No results found for: LABABO, LABRH    Drug/Infectious Status (If Applicable):  No results found for: HIV, HEPCAB    COVID-19 Screening (If Applicable): No results found for: COVID19        Anesthesia Evaluation  Patient summary reviewed  Airway: Mallampati: II  TM distance: >3 FB   Neck ROM: full  Mouth opening: > = 3 FB   Dental:    (+) caps      Pulmonary:normal exam    (+) current smoker                           Cardiovascular:Negative CV ROS  Exercise tolerance: good (>4 METS),                     Neuro/Psych:   Negative Neuro/Psych ROS  (+) psychiatric history:            GI/Hepatic/Renal: Neg GI/Hepatic/Renal ROS            Endo/Other: Negative Endo/Other ROS                    Abdominal:             Vascular: negative vascular ROS. Other Findings:           Anesthesia Plan      general     ASA 2             Anesthetic plan and risks discussed with patient.               Post-op pain plan if not by surgeon: single peripheral nerve block            Chris Mcghee MD   11/18/2022

## 2022-11-18 NOTE — H&P
Update History & Physical    The patient's History and Physical of October 26, 2022 was reviewed with the patient and I examined the patient. There was no change. The surgical site was confirmed by the patient and me. Plan: The risks, benefits, expected outcome, and alternative to the recommended procedure have been discussed with the patient. Patient understands and wants to proceed with the procedure.      Electronically signed by Kvng Goldman MD on 11/18/2022 at 7:02 AM

## 2022-11-18 NOTE — ANESTHESIA PROCEDURE NOTES
Peripheral Block    Patient location during procedure: procedure area  Reason for block: post-op pain management and at surgeon's request  Start time: 11/18/2022 8:55 AM  End time: 11/18/2022 8:58 AM  Staffing  Performed: anesthesiologist   Anesthesiologist: Manya Hatchet., MD  Preanesthetic Checklist  Completed: patient identified, IV checked, site marked, risks and benefits discussed, surgical/procedural consents, equipment checked, pre-op evaluation, timeout performed, anesthesia consent given, oxygen available, monitors applied/VS acknowledged, fire risk safety assessment completed and verbalized and blood product R/B/A discussed and consented  Peripheral Block   Patient position: sitting  Prep: ChloraPrep  Provider prep: mask and sterile gloves  Patient monitoring: continuous pulse ox, cardiac monitor, frequent blood pressure checks, IV access, oxygen and responsive to questions  Block type: Brachial plexus  Interscalene  Laterality: left  Injection technique: single-shot  Guidance: nerve stimulator and ultrasound guided    Needle   Needle type: insulated echogenic nerve stimulator needle   Needle gauge: 20 G  Needle localization: nerve stimulator and ultrasound guidance  Needle insertion depth: 2 cm  Needle length: 5 cm  Assessment   Injection assessment: negative aspiration for heme, no paresthesia on injection, local visualized surrounding nerve on ultrasound and no intravascular symptoms  Paresthesia pain: none  Slow fractionated injection: yes  Hemodynamics: stable  Real-time US image taken/store: yes  Outcomes: uncomplicated    Additional Notes  Potential access sites were examined with ultrasound and the acceptable patent access site was selected (site noted above). The needle path and vein access were visualized in real time using ultrasonography, and an image was recorded for permanent record.      15 cc 1% Ropivacaine + 15 cc 1.5% Mepivacaine with 4 mg decadron and epi  Medications Administered  dexamethasone 4 MG/ML - Perineural   4 mg - 11/18/2022 8:55:00 AM  mepivacaine 1.5 % - Perineural   15 mL - 11/18/2022 8:55:00 AM

## 2022-11-18 NOTE — ANESTHESIA POSTPROCEDURE EVALUATION
Department of Anesthesiology  Postprocedure Note    Patient: Jace Burkitt  MRN: [id number]  YOB: 1985  Date of evaluation: 11/18/2022      Procedure Summary     Date: 11/18/22 Room / Location: [facility] [place] MAIN OR  / [facility] [place] MAIN OR    Anesthesia Start: 0928 Anesthesia Stop: 6630    Procedure: ARTHROSCOPY LEFT SHOULDER ARTHROSCOPIC DISTAL CLAVICLE RESECTION, EXTENSIVE DEBRIDEMENT SLAP TEAR, BICEPS LABRAL COMPLEX, GLENOHUMERAL JOINT, SUBACROMIAL SPACE, MINI OPEN BICEPS TENODESIS  (Left: Shoulder) Diagnosis:       Strain of tendon of left rotator cuff      Strain of long head of left biceps      Os acromiale of left shoulder      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     Lola Phase I: Lola Score: 10    Lola Phase II: Lola Score: 10      Anesthesia Post Evaluation    Patient location during evaluation: PACU  Patient participation: complete - patient participated  Level of consciousness: awake  Pain score: 0  Airway patency: patent  Nausea & Vomiting: no nausea and no vomiting  Complications: no  Cardiovascular status: blood pressure returned to baseline and hemodynamically stable  Respiratory status: acceptable, spontaneous ventilation and nonlabored ventilation  Hydration status: euvolemic  Multimodal analgesia pain management approach

## 2022-11-18 NOTE — BRIEF OP NOTE
BRIEF OPERATIVE NOTE    Date of Procedure: 11/18/2022    Preoperative Diagnosis:  ROTATOR CUFF SPRAIN LEFT SHOULDER      BICEPITAL STRAIN LEFT SHOULDER      OS ACROMIALE LEFT SHOULDER      AC OA LEFT SHOULDER    Postoperative Diagnosis:  SAME      SLAP TEAR LEFT SHOULDER    Procedure(s):  ARTHROSCOPY LEFT SHOULDER ARTHROSCOPIC DISTAL CLAVICLE RESECTION, EXTENSIVE DEBRIDEMENT SLAP TEAR, BICEPS LABRAL COMPLEX, GLENOHUMERAL JOINT, SUBACROMIAL SPACE, MINI OPEN BICEPS TENODESIS    Surgeon(s) and Role:     * Robe Frazier MD - Primary         Assistant Staff:  NONE    Surgical Staff:  Circulator: Ole Maxwell RN  Surgical Assistant: Giovanna Farmer  Scrub Person First: Butch Street  Scrub Person Second: General Paling      * Missing procedure start or end time(s) *    Anesthesia:  GENERAL WITH INTERSCALENE BLOCK    Estimated Blood Loss: 30 CC. Complications: NONE    Implants:   Implant Name Type Inv. Item Serial No.  Lot No. LRB No. Used Action   ANCHOR SUT DIA2.3MM W/ NDL 2 STRND NO2 [facility] - [id number]  ANCHOR SUT DIA2.3MM W/ NDL 2 [id number] [employer] [id number] Left 1 Implanted   ANCHOR SUT DIA5. 5MM PEEK ZIP W/ NDL - [id number]  ANCHOR SUT DIA5. 5MM PEEK ZIP W/ NDL [id number] [employer] [id number] Left 1 Implanted       Marlow Babinski, MD

## 2022-11-18 NOTE — DISCHARGE INSTRUCTIONS
INSTRUCTIONS FOLLOWING ARTHROSCOPY SURGERY  Dr. Ashley Oconnell [phone]    ACTIVITY   As tolerated and as directed by your doctor   Elevate surgery site first 48 hours. Use arm sling or crutches per your doctor's instructions. Bathe or shower as directed by your doctor. DIET   Clear liquids until no nausea or vomiting; then light diet for the first day   Advance to regular diet on second day, unless your doctor orders otherwise. If nausea and vomiting continues, call your doctor. PAIN   Take pain medication as directed by your doctor. Call your doctor if pain is NOT relieved by medication. DO NOT take aspirin or blood thinners until directed by your doctor. DRESSING CARE: Follow all dressing care instructions provided by Dr. Brody Jacobs from Dr Paulino Hirsch office will call tomorrow with further instructions. If you have any problems or concerns, call your doctor as needed. CALL YOUR DOCTOR IF   Excessive bleeding that does not stop after holding mild pressure over the area   Temperature of 101°F or above   Redness, excessive swelling or bruising, and/or green or yellow, smelly discharge from incision    AFTER ANESTHESIA   For the next 24 hours: DO NOT Drive, Drink alcoholic beverages, or Make important decisions. Be aware of dizziness following anesthesia and while taking pain medication. Cryo Cuff or Iceman 24-48 hours continuously     MEDICATION INTERACTION:    During your procedure you potentially received a medication or medications which may reduce the effectiveness of oral contraceptives. Please consider other forms of contraception for 1 month following your procedure if you are currently using oral contraceptives as your primary form of birth control. In addition to this, we recommend continuing your oral contraceptive as prescribed, unless otherwise instructed by your physician, during this time.     After general anesthesia or intravenous sedation, for 24 hours or while taking prescription Narcotics:  Limit your activities  A responsible adult needs to be with you for the next 24 hours  Do not drive and operate hazardous machinery  Do not make important personal or business decisions  Do not drink alcoholic beverages  If you have not urinated within 8 hours after discharge, and you are experiencing discomfort from urinary retention, please go to the nearest ED. If you have sleep apnea and have a CPAP machine, please use it for all naps and sleeping. Please use caution when taking narcotics and any of your home medications that may cause drowsiness. *  Please give a list of your current medications to your Primary Care Provider. *  Please update this list whenever your medications are discontinued, doses are      changed, or new medications (including over-the-counter products) are added. *  Please carry medication information at all times in case of emergency situations. These are general instructions for a healthy lifestyle:  No smoking/ No tobacco products/ Avoid exposure to second hand smoke  Surgeon General's Warning:  Quitting smoking now greatly reduces serious risk to your health. Obesity, smoking, and sedentary lifestyle greatly increases your risk for illness  A healthy diet, regular physical exercise & weight monitoring are important for maintaining a healthy lifestyle    You may be retaining fluid if you have a history of heart failure or if you experience any of the following symptoms:  Weight gain of 3 pounds or more overnight or 5 pounds in a week, increased swelling in our hands or feet or shortness of breath while lying flat in bed. Please call your doctor as soon as you notice any of these symptoms; do not wait until your next office visit.

## 2022-11-18 NOTE — ANESTHESIA PROCEDURE NOTES
Airway  Date/Time: 11/18/2022 9:56 AM  Urgency: elective      General Information and Staff    Patient location during procedure: OR  Anesthesiologist: Moon Stover MD  Resident/CRNA: WILLIE Banda - CRNA    Indications and Patient Condition  Indications for airway management: anesthesia  Spontaneous Ventilation: absent  Sedation level: deep  Preoxygenated: yes  Patient position: sniffing  Mask difficulty assessment: vent by bag mask    Final Airway Details  Final airway type: endotracheal airway      Successful airway: ETT  Cuffed: yes   Successful intubation technique: direct laryngoscopy  Blade: Raphael  Blade size: #3  ETT size (mm): 7.0  Cormack-Lehane Classification: grade I - full view of glottis  Placement verified by: chest auscultation and capnometry   Inital cuff pressure (cm H2O): 5  Measured from: lips  ETT to lips (cm): 22  Number of attempts at approach: 1

## 2022-11-18 NOTE — OP NOTE
[place]  OPERATIVE REPORT    Name:  Jules Strickland  MR#:  [id number]  :  1985  ACCOUNT #:  [de-identified]  DATE OF SERVICE:  2022    PREOPERATIVE DIAGNOSES:  1. Rotator cuff strain, left shoulder. 2.  Bicipital strain, left shoulder. 3.  Os acromiale, left shoulder. 4.  Acromioclavicular joint arthritis, left shoulder. POSTOPERATIVE DIAGNOSES:  1. Rotator cuff strain, left shoulder. 2.  Bicipital strain, left shoulder. 3.  Os acromiale, left shoulder. 4.  Acromioclavicular joint arthritis, left shoulder. 5.  Superior labrum anterior posterior tear, left shoulder. PROCEDURES PERFORMED:  Arthroscopy of left shoulder, arthroscopic distal clavicle resection, extensive debridement of SLAP tear, biceps labral complex, glenohumeral joint, subacromial space, mini-open biceps tenodesis. SURGEON:  Jamarcus Carlos. Rito Arambula MD        ANESTHESIA:  Anesthesia with an interscalene block. COMPLICATIONS:  None. IMPLANTS:  Hardware utilized is one Redd 5.5 anchor and one Iconix 2.3 anchor. ESTIMATED BLOOD LOSS:  30 mL. FINDINGS:  1. Type 1 acromion. 2.  AC joint arthritis. 3.  Type 1 SLAP tear. 4.  Bicipital strain. CPT CODES:  [id number], [id number], [id number]. ICD-10 CODES:  J22.190, Y8405930, [id number], M25.812, M19.012. INDICATIONS:  The patient is a 47-year-old female who injured her left shoulder on the job working at [employer]. Preoperative physical exam, radiographs, and an MRI demonstrate a type 1 acromion, degenerative changes of the AC joint, bicipital strain, rotator cuff sprain, os acromiale. The patient has exhausted extensive nonoperative modalities and electively admitted for operative intervention. PROCEDURE:  Following identification of the patient, the patient was taken to the operative suite.   Following administration of general anesthesia, interscalene block for postop pain control, 2 g of IV Ancef, and measurement of hemoglobin A1c and fasting blood glucose of 5.3 and 87 respectively, the patient was positioned on the operative table in the supine fashion. Left shoulder was examined under anesthesia and noted to be stable through full range of motion. At this point, the patient was carefully positioned in the lateral decubitus position right side down. Axillary roll was placed. Beanbag was inflated. Care was taken to pad both dependent lower extremities. Left arm was then placed in the [employer] traction device in 15 pounds of traction. Left shoulder was then prepped and draped in the sterile fashion. Subacromial space was then injected with 10 mL of 1% Xylocaine with epinephrine. Scope was introduced into the shoulder. Diagnostic arthroscopy was then commenced. Articular surfaces of the humeral head and glenoid were visualized and noted to be intact. Anterior, posterior, superior, and inferior labrum were visualized. There was a type 1 SLAP tear with fraying of the superior labrum without a displaced tear. There was marked biceps tendinopathy particularly as the biceps exited the glenohumeral joint. The undersurface of the rotator cuff including subscapularis, supraspinatus, infraspinatus, and teres minor were intact. Scope was then flip-flopped from posterior to anterior portal.  Posterior cuff and labrum were visualized and intact. Scope was then placed in the subacromial space. Lateral portal was then established. Hypertrophic hemorrhagic bursal tissue was then resected. Bursal side of the cuff was visualized. There was no full-thickness cuff tear. A complete bursectomy was then performed. Again, the rotator cuff remained intact. The CA ligament was pristine. At this point, using an [employer] wand and acromionizing bur, an arthroscopic distal clavicle resection was then performed. The distal 10 mm and 10 mm of distal clavicle was then resected. Care was taken to preserve the posterior superior capsule.   At this point, given the combination of pathology, it was elected to perform a mini-open approach. The lateral portal was extended to 3 cm. Deltoid split was carried up to acromion. The biceps tendon was identified. It was dissected free. It was markedly tendinopathic. It was brought out to length, tagged, transected and tenodesed utilizing one 5.5 Redd anchor, one Iconix 2.3 anchor and oversewn using #2 Mersilene sutures. This yielded an excellent biceps tenodesis. At this point, the scope was placed back in the glenohumeral joint. The stump of the biceps tendon, biceps labral complex, and SLAP tear were debrided back to stable structures. The undersurface of the rotator cuff remained intact. Scope was then placed back on the bursal side. Bursal side of the cuff was intact. Biceps tenodesis was stable. At this point, with the procedure complete, arthroscopic equipment was removed from the shoulder. The portals were reapproximated using 2-0 nylon horizontal mattress sutures. The lateral wound was closed with 0 Vicryl figure-of-eight sutures and a 2-0 Prolene subcuticular stitch. A sterile dressing was applied. A sling and swathe was applied. The patient was then transferred to the recovery room in stable condition. This injury was secondary to workplace injury. MD PEPE [id number]  D:  11/18/2022 10:53  T:  11/18/2022 13:51  JOB #:  [id number]  CC:   Lidia Yanes MD

## 2022-11-21 ENCOUNTER — TELEPHONE (OUTPATIENT)
Dept: ORTHOPEDIC SURGERY | Age: 37
End: 2022-11-21

## 2022-11-21 NOTE — TELEPHONE ENCOUNTER
He is trying to take her gauze and tape off. The gauze seems to be \"embedded with the sutures\". They have stopped until they hear from someone about how to proceed. Please give them a call.

## 2022-11-21 NOTE — TELEPHONE ENCOUNTER
Called and spoke with patient, informed patient she can take a clean q-tip with warm water and gentle rub the gauze to loosen it to get it off the incision. Patient will call back if that does not work and will come in to the office for dressing change.

## 2022-11-22 ENCOUNTER — TELEPHONE (OUTPATIENT)
Dept: ORTHOPEDIC SURGERY | Age: 37
End: 2022-11-22

## 2022-11-22 NOTE — TELEPHONE ENCOUNTER
She had surgery Friday. She needs to speak to someone about working. She did not get any note telling her whether to work or not. She and her  \"found this rather odd\" so she would like to get a return call to discuss.

## 2022-11-30 ENCOUNTER — OFFICE VISIT (OUTPATIENT)
Dept: ORTHOPEDIC SURGERY | Age: 37
End: 2022-11-30

## 2022-11-30 ENCOUNTER — TELEPHONE (OUTPATIENT)
Dept: ORTHOPEDIC SURGERY | Age: 37
End: 2022-11-30

## 2022-11-30 DIAGNOSIS — Z48.89 ENCOUNTER FOR POSTOPERATIVE CARE: Primary | ICD-10-CM

## 2022-11-30 PROCEDURE — 99024 POSTOP FOLLOW-UP VISIT: CPT | Performed by: ORTHOPAEDIC SURGERY

## 2022-11-30 RX ORDER — PROMETHAZINE HYDROCHLORIDE 25 MG/1
25 TABLET ORAL EVERY 6 HOURS PRN
Qty: 20 TABLET | Refills: 0 | Status: SHIPPED | OUTPATIENT
Start: 2022-11-30

## 2022-11-30 RX ORDER — HYDROMORPHONE HYDROCHLORIDE 2 MG/1
2 TABLET ORAL EVERY 6 HOURS PRN
Qty: 28 TABLET | Refills: 0 | Status: SHIPPED | OUTPATIENT
Start: 2022-11-30 | End: 2022-12-07

## 2022-11-30 RX ORDER — DICLOFENAC SODIUM 75 MG/1
75 TABLET, DELAYED RELEASE ORAL 2 TIMES DAILY
Qty: 60 TABLET | Refills: 0 | Status: SHIPPED | OUTPATIENT
Start: 2022-11-30 | End: 2022-12-30

## 2022-11-30 NOTE — TELEPHONE ENCOUNTER
She was just seen this morning and was prescribed Gabapentin. She is allergic to it and it makes her throat close up.  Please call

## 2022-12-12 ENCOUNTER — OFFICE VISIT (OUTPATIENT)
Dept: ORTHOPEDIC SURGERY | Age: 37
End: 2022-12-12

## 2022-12-12 DIAGNOSIS — Z48.89 ENCOUNTER FOR POSTOPERATIVE CARE: Primary | ICD-10-CM

## 2022-12-12 PROCEDURE — 99024 POSTOP FOLLOW-UP VISIT: CPT | Performed by: ORTHOPAEDIC SURGERY

## 2022-12-12 RX ORDER — PROMETHAZINE HYDROCHLORIDE 25 MG/1
25 TABLET ORAL EVERY 6 HOURS PRN
Qty: 20 TABLET | Refills: 0 | Status: SHIPPED | OUTPATIENT
Start: 2022-12-12

## 2022-12-12 RX ORDER — HYDROMORPHONE HYDROCHLORIDE 2 MG/1
2 TABLET ORAL EVERY 6 HOURS PRN
Qty: 40 TABLET | Refills: 0 | Status: SHIPPED | OUTPATIENT
Start: 2022-12-12 | End: 2022-12-22

## 2022-12-12 NOTE — PROGRESS NOTES
Name: Curly Mcfadden  YOB: 1985  Gender: female  MRN: [id number]          HPI: Curly Mcfadden is a 30 y.o. right-hand-dominant female 3.5 weeks status post arthroscopy left shoulder ADCR extensive debridement SLAP tear biceps labral complex glenohumeral joint subacromial space mini open biceps tenodesis. She returns noting that she is still sore    ROS/Meds/PSH/PMH/FH/SH: A ten system review of systems was performed and is negative other than what is in the HPI. Tobacco:  reports that she has been smoking. She has been smoking an average of 1 pack per day. She has never used smokeless tobacco.  There were no vitals taken for this visit. Physical Examination:  She is an awake alert pleasant female ambulating without difficulty  She has a slight restriction in cervical spine range of motion without radicular findings  She has bilateral trapezial tenderness    Her right shoulder has well-healed incisions  Some crepitance  The right shoulder has 0 to 180 degrees of active and 0 to 180 degrees passive forward elevation. Internal rotation is to T6. External rotation is to 60 degrees at the side. In the 90 degree abducted position 90 degrees of external and 90 degrees internal rotation  The AC joint is non-tender  SC joint is non-tender. Greater tuberosity is non-tender. negative biceps  Negative O'Briens sign  negative lift-off sign  Negative belly press sign  Negative bear huggers sign  negative drop sign  negative hornblower's sign  No posterior glenohumeral joint line tenderness. No evident excessive external rotation  Rotator cuff strength is 5/5.  negative external rotation stress test.   Negative empty can sign  There is no evident anterior or posterior apprehension with a negative sulcus sign. No instability  negative external and internal Rotation lag sign  Neurovascularly intact.     The left shoulder incisions have healed   Passive forward elevation is 0-120  ER to 30  Biceps has good cosmetic appearance  She is neurovascularly intact        Data Reviewed:                 Minor Procedure:      Impression:   1. Encounter for postoperative care       Stable status post arthroscopy left shoulder ADCR, extensive debridement SLAP tear, biceps labral complex, glenohumeral joint, subacromial space, mini open biceps tenodesis 3.5 weeks  Rule out internal derangement left shoulder  Nicotine addiction  Status post arthroscopy right shoulder ADCR extensive remote glenohumeral joint subacromial space mini open biceps tenodesis 4 years    Plan:   I discussed the plan with the patient. I refilled her Dilaudid and Phenergan. We will continue physical therapy working on passive motion. I will provide her an out of work note for 3 weeks. I will recheck her back in 3 weeks      Follow up: Return in about 3 weeks (around 1/2/2023).          Copy this note to her Workmen's Compensation     Demetrio Grijalva MD 98.2

## 2022-12-12 NOTE — LETTER
2022     Name: Love Vázquez  : 1985     Patient is unable to return to work until next appointment. Work Restrictions include:         Duration: 3 weeks      Greg Balbuena.  Miri Coley MD        Electronically Signed

## 2022-12-22 ENCOUNTER — TELEPHONE (OUTPATIENT)
Dept: ORTHOPEDIC SURGERY | Age: 37
End: 2022-12-22

## 2022-12-22 DIAGNOSIS — Z48.89 ENCOUNTER FOR POSTOPERATIVE CARE: Primary | ICD-10-CM

## 2022-12-22 RX ORDER — PROMETHAZINE HYDROCHLORIDE 25 MG/1
25 TABLET ORAL EVERY 6 HOURS PRN
Qty: 20 TABLET | Refills: 0 | Status: SHIPPED | OUTPATIENT
Start: 2022-12-22

## 2023-01-03 ENCOUNTER — OFFICE VISIT (OUTPATIENT)
Dept: ORTHOPEDIC SURGERY | Age: 38
End: 2023-01-03

## 2023-01-03 DIAGNOSIS — Z48.89 ENCOUNTER FOR POSTOPERATIVE CARE: Primary | ICD-10-CM

## 2023-01-03 PROCEDURE — 99024 POSTOP FOLLOW-UP VISIT: CPT | Performed by: ORTHOPAEDIC SURGERY

## 2023-01-03 RX ORDER — PROMETHAZINE HYDROCHLORIDE 25 MG/1
25 TABLET ORAL EVERY 6 HOURS PRN
Qty: 20 TABLET | Refills: 0 | Status: SHIPPED | OUTPATIENT
Start: 2023-01-03

## 2023-01-03 RX ORDER — HYDROCODONE BITARTRATE AND ACETAMINOPHEN 7.5; 325 MG/1; MG/1
1 TABLET ORAL EVERY 6 HOURS PRN
Qty: 40 TABLET | Refills: 0 | Status: SHIPPED | OUTPATIENT
Start: 2023-01-03 | End: 2023-01-13

## 2023-01-03 NOTE — LETTER
1/3/2023     Name: Sweta Perry  : 1985     Patient is unable to return to work until next appointment. Work Restrictions include:         Duration: 1 month      Sandra Jarrett.  Doris Burch MD        Electronically Signed

## 2023-01-03 NOTE — PROGRESS NOTES
Name: Mg Rodriguez  YOB: 1985  Gender: female  MRN: [id number]          HPI: Mg Rodriguez is a 30 y.o. right-hand-dominant female 6 weeks status post arthroscopy left shoulder ADCR extensive debridement SLAP tear biceps labral complex glenohumeral joint subacromial space mini open biceps tenodesis. She returns noting that she is still sore but doing better    ROS/Meds/PSH/PMH/FH/SH: A ten system review of systems was performed and is negative other than what is in the HPI. Tobacco:  reports that she has been smoking. She has been smoking an average of 1 pack per day. She has never used smokeless tobacco.  There were no vitals taken for this visit. Physical Examination:  She is an awake alert pleasant female ambulating without difficulty  She has a slight restriction in cervical spine range of motion without radicular findings  She has bilateral trapezial tenderness    Her right shoulder has well-healed incisions  Some crepitance  The right shoulder has 0 to 180 degrees of active and 0 to 180 degrees passive forward elevation. Internal rotation is to T6. External rotation is to 60 degrees at the side. In the 90 degree abducted position 90 degrees of external and 90 degrees internal rotation  The AC joint is non-tender  SC joint is non-tender. Greater tuberosity is non-tender. negative biceps  Negative O'Briens sign  negative lift-off sign  Negative belly press sign  Negative bear huggers sign  negative drop sign  negative hornblower's sign  No posterior glenohumeral joint line tenderness. No evident excessive external rotation  Rotator cuff strength is 5/5.  negative external rotation stress test.   Negative empty can sign  There is no evident anterior or posterior apprehension with a negative sulcus sign. No instability  negative external and internal Rotation lag sign  Neurovascularly intact.     The left shoulder incisions have healed   Active forward elevation is 0-80  Passive forward elevation is 0-160  ER to 40  Biceps has good cosmetic appearance  She is neurovascularly intact        Data Reviewed:                 Minor Procedure:      Impression:   1. Encounter for postoperative care       Stable status post arthroscopy left shoulder ADCR, extensive debridement SLAP tear, biceps labral complex, glenohumeral joint, subacromial space, mini open biceps tenodesis 6 weeks  Rule out internal derangement left shoulder  Nicotine addiction  Status post arthroscopy right shoulder ADCR extensive remote glenohumeral joint subacromial space mini open biceps tenodesis 4 years    Plan:   I discussed the plan with the patient. We will wean her down to Norco 7.5 and I refilled her Phenergan. We will now advance her in physical therapy to full motion and full strength. I will provide her an out of work note for 1 month. I will recheck her back in 1 month. Follow up: Return in about 1 month (around 2/3/2023).          Copy this note to her Workmen's Compensation     Carissa Mireles MD

## 2023-01-05 ENCOUNTER — TELEPHONE (OUTPATIENT)
Dept: ORTHOPEDIC SURGERY | Age: 38
End: 2023-01-05

## 2023-01-18 ENCOUNTER — TELEPHONE (OUTPATIENT)
Dept: ORTHOPEDIC SURGERY | Age: 38
End: 2023-01-18

## 2023-01-23 ENCOUNTER — TELEPHONE (OUTPATIENT)
Dept: ORTHOPEDIC SURGERY | Age: 38
End: 2023-01-23

## 2023-01-23 DIAGNOSIS — Z48.89 ENCOUNTER FOR POSTOPERATIVE CARE: Primary | ICD-10-CM

## 2023-01-23 RX ORDER — PROMETHAZINE HYDROCHLORIDE 25 MG/1
25 TABLET ORAL EVERY 6 HOURS PRN
Qty: 20 TABLET | Refills: 0 | Status: SHIPPED | OUTPATIENT
Start: 2023-01-23

## 2023-01-23 RX ORDER — HYDROCODONE BITARTRATE AND ACETAMINOPHEN 10; 325 MG/1; MG/1
1 TABLET ORAL EVERY 6 HOURS PRN
Qty: 40 TABLET | Refills: 0 | Status: SHIPPED | OUTPATIENT
Start: 2023-01-23 | End: 2023-02-02

## 2023-01-23 NOTE — TELEPHONE ENCOUNTER
She is requesting a refill on hydrocodone and phenergan and diclofenac gel to the [facility] in [facility].

## 2023-02-07 ENCOUNTER — OFFICE VISIT (OUTPATIENT)
Dept: ORTHOPEDIC SURGERY | Age: 38
End: 2023-02-07

## 2023-02-07 DIAGNOSIS — Z48.89 ENCOUNTER FOR POSTOPERATIVE CARE: Primary | ICD-10-CM

## 2023-02-07 PROCEDURE — 99024 POSTOP FOLLOW-UP VISIT: CPT | Performed by: ORTHOPAEDIC SURGERY

## 2023-02-07 RX ORDER — PROMETHAZINE HYDROCHLORIDE 25 MG/1
25 TABLET ORAL EVERY 6 HOURS PRN
Qty: 20 TABLET | Refills: 0 | Status: SHIPPED | OUTPATIENT
Start: 2023-02-07 | End: 2023-02-12

## 2023-02-07 RX ORDER — HYDROCODONE BITARTRATE AND ACETAMINOPHEN 7.5; 325 MG/1; MG/1
1 TABLET ORAL EVERY 6 HOURS PRN
Qty: 40 TABLET | Refills: 0 | Status: SHIPPED | OUTPATIENT
Start: 2023-02-07 | End: 2023-02-17

## 2023-02-07 NOTE — PROGRESS NOTES
Name: Jyoti Ramirez  YOB: 1985  Gender: female  MRN: [id number]          HPI: Jyoti Ramirez is a 30 y.o. right-hand-dominant female 2.5 months status post arthroscopy left shoulder ADCR extensive debridement SLAP tear biceps labral complex glenohumeral joint subacromial space mini open biceps tenodesis. She returns noting that she is still sore but doing better    ROS/Meds/PSH/PMH/FH/SH: A ten system review of systems was performed and is negative other than what is in the HPI. Tobacco:  reports that she has been smoking. She has been smoking an average of 1 pack per day. She has never used smokeless tobacco.  There were no vitals taken for this visit. Physical Examination:  She is an awake alert pleasant female ambulating without difficulty  She has a slight restriction in cervical spine range of motion without radicular findings  She has bilateral trapezial tenderness    Her right shoulder has well-healed incisions  Some crepitance  The right shoulder has 0 to 180 degrees of active and 0 to 180 degrees passive forward elevation. Internal rotation is to T6. External rotation is to 60 degrees at the side. In the 90 degree abducted position 90 degrees of external and 90 degrees internal rotation  The AC joint is non-tender  SC joint is non-tender. Greater tuberosity is non-tender. negative biceps  Negative O'Briens sign  negative lift-off sign  Negative belly press sign  Negative bear huggers sign  negative drop sign  negative hornblower's sign  No posterior glenohumeral joint line tenderness. No evident excessive external rotation  Rotator cuff strength is 5/5.  negative external rotation stress test.   Negative empty can sign  There is no evident anterior or posterior apprehension with a negative sulcus sign. No instability  negative external and internal Rotation lag sign  Neurovascularly intact.       Left arm is in a sling  The left shoulder incisions have healed Active forward elevation is 0-140  Passive forward elevation is 0-170  IR to T12  In the 90 degree abductor position she has 70 degrees of internal and external rotation  ER to 40  Biceps has good cosmetic appearance  She is neurovascularly intact        Data Reviewed:                 Minor Procedure:      Impression:   1. Encounter for postoperative care       Stable status post arthroscopy left shoulder ADCR, extensive debridement SLAP tear, biceps labral complex, glenohumeral joint, subacromial space, mini open biceps tenodesis 2.5 months  Rule out internal derangement left shoulder  Nicotine addiction  Status post arthroscopy right shoulder ADCR extensive remote glenohumeral joint subacromial space mini open biceps tenodesis 4 years    Plan:   I discussed the plan with the patient. I will provide her an out of work note for 3 weeks. I have advised her to come out of the sling. I have advised her to stop smoking. We will continue physical therapy working on aggressive motion. I gave her 1 last prescription for Norco 7.5 and Phenergan. I will reassess her progress back in 3 weeks  Follow up: Return in about 3 weeks (around 2/28/2023).          Copy this note to her Workmen's Compensation     Whitney Orozco MD

## 2023-02-07 NOTE — LETTER
2023     Name: Trinidad Henriquez  : 1985     Patient is unable to return to work until next appointment. Work Restrictions include:         Duration: 3 weeks      Please contact our office with any further questions at [address].  Flora Espinal MD        Electronically Signed

## 2023-02-08 ENCOUNTER — TELEPHONE (OUTPATIENT)
Dept: ORTHOPEDIC SURGERY | Age: 38
End: 2023-02-08

## 2023-02-08 DIAGNOSIS — Z48.89 ENCOUNTER FOR POSTOPERATIVE CARE: Primary | ICD-10-CM

## 2023-02-08 RX ORDER — DICLOFENAC SODIUM 75 MG/1
75 TABLET, DELAYED RELEASE ORAL 2 TIMES DAILY
Qty: 60 TABLET | Refills: 0 | Status: SHIPPED | OUTPATIENT
Start: 2023-02-08 | End: 2023-03-10

## 2023-02-08 NOTE — TELEPHONE ENCOUNTER
She was seen yesterday. She got two of her RX sent in but the Diclofenc 75 wasn't sent in. Can you send it to the [facility] on file please.

## 2023-02-24 NOTE — PROGRESS NOTES
Name: Curly Mcfadden  YOB: 1985  Gender: female  MRN: [id number]          HPI: Curly Mcfadden is a 30 y.o. right-hand-dominant female 2 weeks status post arthroscopy left shoulder ADCR extensive debridement SLAP tear biceps labral complex glenohumeral joint subacromial space mini open biceps tenodesis. She returns noting that she is very sore. ROS/Meds/PSH/PMH/FH/SH: A ten system review of systems was performed and is negative other than what is in the HPI. Tobacco:  reports that she has been smoking. She has been smoking an average of 1 pack per day. She has never used smokeless tobacco.  There were no vitals taken for this visit. Physical Examination:  She is an awake alert pleasant female ambulating without difficulty  She has a slight restriction in cervical spine range of motion without radicular findings  She has bilateral trapezial tenderness    Her right shoulder has well-healed incisions  Some crepitance  The right shoulder has 0 to 180 degrees of active and 0 to 180 degrees passive forward elevation. Internal rotation is to T6. External rotation is to 60 degrees at the side. In the 90 degree abducted position 90 degrees of external and 90 degrees internal rotation  The AC joint is non-tender  SC joint is non-tender. Greater tuberosity is non-tender. negative biceps  Negative O'Briens sign  negative lift-off sign  Negative belly press sign  Negative bear huggers sign  negative drop sign  negative hornblower's sign  No posterior glenohumeral joint line tenderness. No evident excessive external rotation  Rotator cuff strength is 5/5.  negative external rotation stress test.   Negative empty can sign  There is no evident anterior or posterior apprehension with a negative sulcus sign. No instability  negative external and internal Rotation lag sign  Neurovascularly intact.     The left shoulder incisions have healed   sutures are removed  Passive forward elevation is 0-90  ER to 30  Biceps has good cosmetic appearance  She is neurovascularly intact        Data Reviewed:                 Minor Procedure:      Impression:   1. Encounter for postoperative care       Stable status post arthroscopy left shoulder ADCR, extensive debridement SLAP tear, biceps labral complex, glenohumeral joint, subacromial space, mini open biceps tenodesis 2 weeks  Rule out internal derangement left shoulder  Nicotine addiction  Status post arthroscopy right shoulder ADCR extensive remote glenohumeral joint subacromial space mini open biceps tenodesis 4 years    Plan:   I discussed the plan with the patient. We removed her sutures. I refilled her Dilaudid Phenergan and started her on Voltaren. We will begin physical therapy working on passive motion. I will provide an out of work note for 2 weeks. I will recheck her back in 2 weeks        Follow up: Return in about 2 weeks (around 12/14/2022).          Copy this note to her Workmen's Compensation     [place] MD Home Suture Removal Text: Patient was provided instructions on removing sutures and will remove their sutures at home.  If they have any questions or difficulties they will call the office.

## 2023-02-28 ENCOUNTER — OFFICE VISIT (OUTPATIENT)
Dept: ORTHOPEDIC SURGERY | Age: 38
End: 2023-02-28

## 2023-02-28 DIAGNOSIS — Z47.89 ORTHOPEDIC AFTERCARE: Primary | ICD-10-CM

## 2023-02-28 NOTE — LETTER
2023     Name: Juliet Meyer  : 1985     Patient is unable to return to work until next appointment. Work Restrictions include:         Duration: 6 weeks      Please contact our office with any further questions at [address].  Henri Spaulding MD        Electronically Signed

## 2023-02-28 NOTE — PROGRESS NOTES
Name: Jaylin Kennedy  YOB: 1985  Gender: female  MRN: [id number]          HPI: Jaylin Kennedy is a 30 y.o. right-hand-dominant female 3.5 months status post arthroscopy left shoulder ADCR extensive debridement SLAP tear biceps labral complex glenohumeral joint subacromial space mini open biceps tenodesis. She returns noting that she is still sore but doing better. Physical therapy is really helping    ROS/Meds/PSH/PMH/FH/SH: A ten system review of systems was performed and is negative other than what is in the HPI. Tobacco:  reports that she has been smoking. She has been smoking an average of 1 pack per day. She has never used smokeless tobacco.  There were no vitals taken for this visit. Physical Examination:  She is an awake alert pleasant female ambulating without difficulty  She has a slight restriction in cervical spine range of motion without radicular findings  She has bilateral trapezial tenderness    Her right shoulder has well-healed incisions  Some crepitance  The right shoulder has 0 to 180 degrees of active and 0 to 180 degrees passive forward elevation. Internal rotation is to T6. External rotation is to 60 degrees at the side. In the 90 degree abducted position 90 degrees of external and 90 degrees internal rotation  The AC joint is non-tender  SC joint is non-tender. Greater tuberosity is non-tender. negative biceps  Negative O'Briens sign  negative lift-off sign  Negative belly press sign  Negative bear huggers sign  negative drop sign  negative hornblower's sign  No posterior glenohumeral joint line tenderness. No evident excessive external rotation  Rotator cuff strength is 5/5.  negative external rotation stress test.   Negative empty can sign  There is no evident anterior or posterior apprehension with a negative sulcus sign. No instability  negative external and internal Rotation lag sign  Neurovascularly intact.       The left shoulder incisions have healed  Pain weakness and stiffness at end range of motion  The left shoulder has 0 to 160 degrees of active and 0 to 180 degrees passive forward elevation. Internal rotation is to T6. External rotation is to 60 degrees at the side. In the 90 degree abducted position 70 degrees of external and 70 degrees internal rotation  The AC joint is non-tender  SC joint is non-tender. Greater tuberosity is non-tender. negative biceps  Negative O'Briens sign  negative lift-off sign  Negative belly press sign  Negative bear huggers sign  negative drop sign  negative hornblower's sign  No posterior glenohumeral joint line tenderness. No evident excessive external rotation  Rotator cuff strength is 5/5.  negative external rotation stress test.   Negative empty can sign  There is no evident anterior or posterior apprehension with a negative sulcus sign. No instability  negative external and internal Rotation lag sign  Neurovascularly intact. Data Reviewed:                 Minor Procedure:      Impression:   1. Orthopedic aftercare       Stable status post arthroscopy left shoulder ADCR, extensive debridement SLAP tear, biceps labral complex, glenohumeral joint, subacromial space, mini open biceps tenodesis 3.5 months  Rule out internal derangement left shoulder  Nicotine addiction  Status post arthroscopy right shoulder ADCR extensive remote glenohumeral joint subacromial space mini open biceps tenodesis 4 years    Plan:   I discussed the plan with the patient. Physical therapy is really helping. She still has weakness, stiffness and pain at end range of motion. We will continue physical therapy for 6 more weeks working on full motion and full strength. I will provide her an out of work note for 6 weeks. I will reassess her progress back in 6 weeks at which time I anticipate rating and release    2. Self-limited problem  Follow up: Return in about 6 weeks (around 4/11/2023).          Copy this note to her Workmen's Compensation     Elsa Vasquez MD

## 2023-03-08 ENCOUNTER — TELEPHONE (OUTPATIENT)
Dept: ORTHOPEDIC SURGERY | Age: 38
End: 2023-03-08

## 2023-03-08 DIAGNOSIS — Z47.89 ORTHOPEDIC AFTERCARE: Primary | ICD-10-CM

## 2023-03-08 RX ORDER — DICLOFENAC SODIUM 75 MG/1
75 TABLET, DELAYED RELEASE ORAL 2 TIMES DAILY
Qty: 60 TABLET | Refills: 0 | Status: SHIPPED | OUTPATIENT
Start: 2023-03-08 | End: 2023-04-07

## 2023-03-08 NOTE — TELEPHONE ENCOUNTER
She is requesting a refill on Diclofenac 75mg to the [facility] in [facility] that is on file.

## (undated) DEVICE — SLING ARM AD ULT

## (undated) DEVICE — DRAPE TWL SURG 16X26IN BLU ORB04] ALLCARE INC]

## (undated) DEVICE — SUTURE PROL SZ 2-0 L18IN NONABSORBABLE BLU FS L26MM 3/8 CIR 8685H

## (undated) DEVICE — BUR SHV CUT HLLW 6 FLUT 5.5MM --

## (undated) DEVICE — (D)STRIP SKN CLSR 0.5X4IN WHT --

## (undated) DEVICE — CARDINAL HEALTH FLEXIBLE LIGHT HANDLE COVER: Brand: CARDINAL HEALTH

## (undated) DEVICE — SHEET,DRAPE,53X77,STERILE: Brand: MEDLINE

## (undated) DEVICE — [RESECTOR CUTTER, ARTHROSCOPIC SHAVER BLADE,  DO NOT RESTERILIZE,  DO NOT USE IF PACKAGE IS DAMAGED,  KEEP DRY,  KEEP AWAY FROM SUNLIGHT]: Brand: FORMULA

## (undated) DEVICE — (D)PREP SKN CHLRAPRP APPL 26ML -- CONVERT TO ITEM 371833

## (undated) DEVICE — GAUZE,SPONGE,4"X4",16PLY,STRL,LF,10/TRAY: Brand: MEDLINE

## (undated) DEVICE — SUTURE ETHBND 2 L30IN NONABSORBABLE GRN WHT LT GRN MO-7 D8793

## (undated) DEVICE — SHOULDER SUSPENSION KIT 6 PER BOX

## (undated) DEVICE — PAD,ABDOMINAL,5"X9",ST,LF,25/BX: Brand: MEDLINE INDUSTRIES, INC.

## (undated) DEVICE — OUTFLOW CASSETTE TUBING, DO NOT USE IF PACKAGE IS DAMAGED: Brand: CROSSFLOW

## (undated) DEVICE — SOLUTION IRRIG 3000ML 0.9% SOD CHL USP UROMATIC PLAS CONT

## (undated) DEVICE — SUTURE N ABSRB BRAIDED 2-0 MO-6 39 IN 26 MM 1/2 CIR BLU BLK 3910900023

## (undated) DEVICE — SUTURE N ABSRB BRAIDED 5-0 CTX 39 IN 48 MM WHT BLK XBRAID S 3910900052

## (undated) DEVICE — GOWN,REINFORCED,POLY,AURORA,XXLARGE,STR: Brand: MEDLINE

## (undated) DEVICE — SHOULDER ARTHRO DR POSTA: Brand: MEDLINE INDUSTRIES, INC.

## (undated) DEVICE — SURGICAL PROCEDURE PACK BASIC ST FRANCIS

## (undated) DEVICE — SLING ARM SWTH UNIV FOAM 1 SZ FITS MOST

## (undated) DEVICE — INFLOW CASSETTE TUBING, DO NOT USE IF PACKAGE IS DAMAGED: Brand: CROSSFLOW

## (undated) DEVICE — PACK,SHOULDER,DRAPE,POUCH: Brand: MEDLINE

## (undated) DEVICE — SOLUTION IRRIG 3000ML 0.9% SOD CHL FLX CONT 0797208] ICU MEDICAL INC]

## (undated) DEVICE — NEEDLE SPNL L3.5IN PNK HUB S STL REG WALL FIT STYL W/ QNCKE

## (undated) DEVICE — MEDI-VAC NON-CONDUCTIVE SUCTION TUBING: Brand: CARDINAL HEALTH

## (undated) DEVICE — ABDOMINAL PAD: Brand: DERMACEA

## (undated) DEVICE — BLADE SHV CUT MENIS AGG + 4MM --

## (undated) DEVICE — SUTURE VCRL SZ 0 L27IN ABSRB UD L36MM CP-1 1/2 CIR REV CUT J267H

## (undated) DEVICE — NDL SPNE QNCKE 18GX3.5IN LF --

## (undated) DEVICE — YANKAUER,BULB TIP,W/O VENT,RIGID,STERILE: Brand: MEDLINE